# Patient Record
Sex: MALE | Race: WHITE | NOT HISPANIC OR LATINO | Employment: OTHER | URBAN - NONMETROPOLITAN AREA
[De-identification: names, ages, dates, MRNs, and addresses within clinical notes are randomized per-mention and may not be internally consistent; named-entity substitution may affect disease eponyms.]

---

## 2019-01-01 ENCOUNTER — HOSPITAL ENCOUNTER (INPATIENT)
Facility: HOSPITAL | Age: 54
LOS: 4 days | End: 2019-11-17
Attending: HOSPITALIST | Admitting: HOSPITALIST

## 2019-01-01 ENCOUNTER — APPOINTMENT (OUTPATIENT)
Dept: GENERAL RADIOLOGY | Facility: HOSPITAL | Age: 54
End: 2019-01-01

## 2019-01-01 ENCOUNTER — ANESTHESIA EVENT (OUTPATIENT)
Dept: ICU | Facility: HOSPITAL | Age: 54
End: 2019-01-01

## 2019-01-01 ENCOUNTER — APPOINTMENT (OUTPATIENT)
Dept: CARDIOLOGY | Facility: HOSPITAL | Age: 54
End: 2019-01-01

## 2019-01-01 ENCOUNTER — ANESTHESIA (OUTPATIENT)
Dept: CARDIOLOGY | Facility: HOSPITAL | Age: 54
End: 2019-01-01

## 2019-01-01 ENCOUNTER — ANESTHESIA (OUTPATIENT)
Dept: ICU | Facility: HOSPITAL | Age: 54
End: 2019-01-01

## 2019-01-01 ENCOUNTER — ANESTHESIA EVENT (OUTPATIENT)
Dept: CARDIOLOGY | Facility: HOSPITAL | Age: 54
End: 2019-01-01

## 2019-01-01 VITALS
RESPIRATION RATE: 16 BRPM | BODY MASS INDEX: 23.11 KG/M2 | WEIGHT: 147.27 LBS | DIASTOLIC BLOOD PRESSURE: 59 MMHG | HEART RATE: 88 BPM | HEIGHT: 67 IN | SYSTOLIC BLOOD PRESSURE: 85 MMHG | TEMPERATURE: 98.1 F | OXYGEN SATURATION: 89 %

## 2019-01-01 DIAGNOSIS — R07.9 CHEST PAIN, UNSPECIFIED TYPE: ICD-10-CM

## 2019-01-01 DIAGNOSIS — R06.00 DYSPNEA, UNSPECIFIED TYPE: ICD-10-CM

## 2019-01-01 DIAGNOSIS — R77.8 ELEVATED TROPONIN: Primary | ICD-10-CM

## 2019-01-01 LAB
A-A DO2: 109.2 MMHG
A-A DO2: 124.7 MMHG
A-A DO2: 327.6 MMHG
A-A DO2: 65.3 MMHG
A-A DO2: 97.1 MMHG
ALBUMIN SERPL-MCNC: 3.3 G/DL (ref 3.5–5.2)
ALBUMIN SERPL-MCNC: 3.4 G/DL (ref 3.5–5.2)
ALBUMIN/GLOB SERPL: 1.7 G/DL
ALP SERPL-CCNC: 102 U/L (ref 39–117)
ALT SERPL W P-5'-P-CCNC: 35 U/L (ref 1–41)
ANION GAP SERPL CALCULATED.3IONS-SCNC: 15 MMOL/L (ref 5–15)
ANION GAP SERPL CALCULATED.3IONS-SCNC: 15 MMOL/L (ref 5–15)
ANION GAP SERPL CALCULATED.3IONS-SCNC: 16 MMOL/L (ref 5–15)
ANION GAP SERPL CALCULATED.3IONS-SCNC: 17 MMOL/L (ref 5–15)
ANION GAP SERPL CALCULATED.3IONS-SCNC: 18 MMOL/L (ref 5–15)
ANION GAP SERPL CALCULATED.3IONS-SCNC: 8 MMOL/L (ref 5–15)
ANION GAP SERPL CALCULATED.3IONS-SCNC: 9 MMOL/L (ref 5–15)
APTT PPP: 105.7 SECONDS (ref 20–40.3)
APTT PPP: 34.6 SECONDS (ref 20–40.3)
APTT PPP: 46.2 SECONDS (ref 20–40.3)
APTT PPP: 53.2 SECONDS (ref 20–40.3)
APTT PPP: 58.1 SECONDS (ref 20–40.3)
APTT PPP: 59.7 SECONDS (ref 20–40.3)
APTT PPP: 63.2 SECONDS (ref 20–40.3)
APTT PPP: 66.9 SECONDS (ref 20–40.3)
APTT PPP: 74 SECONDS (ref 20–40.3)
ARTERIAL PATENCY WRIST A: ABNORMAL
AST SERPL-CCNC: 41 U/L (ref 1–40)
ATMOSPHERIC PRESS: 752 MMHG
ATMOSPHERIC PRESS: 753 MMHG
ATMOSPHERIC PRESS: 756 MMHG
ATMOSPHERIC PRESS: 757 MMHG
BACTERIA SPEC RESP CULT: NORMAL
BASE EXCESS BLDA CALC-SCNC: -1 MMOL/L (ref 0–2)
BASE EXCESS BLDA CALC-SCNC: -13.5 MMOL/L (ref 0–2)
BASE EXCESS BLDA CALC-SCNC: -13.7 MMOL/L (ref 0–2)
BASE EXCESS BLDA CALC-SCNC: -3.9 MMOL/L (ref 0–2)
BASE EXCESS BLDA CALC-SCNC: -4.3 MMOL/L (ref 0–2)
BASE EXCESS BLDA CALC-SCNC: -4.5 MMOL/L (ref 0–2)
BASE EXCESS BLDA CALC-SCNC: -9.8 MMOL/L (ref 0–2)
BASE EXCESS BLDA CALC-SCNC: 10.6 MMOL/L (ref 0–2)
BASE EXCESS BLDA CALC-SCNC: 5.2 MMOL/L (ref 0–2)
BASE EXCESS BLDA CALC-SCNC: 5.5 MMOL/L (ref 0–2)
BASE EXCESS BLDA CALC-SCNC: 8.1 MMOL/L (ref 0–2)
BASOPHILS # BLD AUTO: 0.02 10*3/MM3 (ref 0–0.2)
BASOPHILS # BLD AUTO: 0.03 10*3/MM3 (ref 0–0.2)
BASOPHILS # BLD AUTO: 0.04 10*3/MM3 (ref 0–0.2)
BASOPHILS # BLD AUTO: 0.1 10*3/MM3 (ref 0–0.2)
BASOPHILS # BLD AUTO: 0.1 10*3/MM3 (ref 0–0.2)
BASOPHILS NFR BLD AUTO: 0.2 % (ref 0–1.5)
BASOPHILS NFR BLD AUTO: 0.3 % (ref 0–1.5)
BASOPHILS NFR BLD AUTO: 0.3 % (ref 0–1.5)
BASOPHILS NFR BLD AUTO: 0.9 % (ref 0–1.5)
BASOPHILS NFR BLD AUTO: 0.9 % (ref 0–1.5)
BDY SITE: ABNORMAL
BH CV ECHO MEAS - ACS: 1.7 CM
BH CV ECHO MEAS - AI DEC SLOPE: 323 CM/SEC^2
BH CV ECHO MEAS - AI MAX PG: 53 MMHG
BH CV ECHO MEAS - AI MAX VEL: 364 CM/SEC
BH CV ECHO MEAS - AI P1/2T: 330.1 MSEC
BH CV ECHO MEAS - AO ISTHMUS: 2.6 CM
BH CV ECHO MEAS - AO MAX PG (FULL): 3.6 MMHG
BH CV ECHO MEAS - AO MAX PG: 5.3 MMHG
BH CV ECHO MEAS - AO MEAN PG (FULL): 2 MMHG
BH CV ECHO MEAS - AO MEAN PG: 3 MMHG
BH CV ECHO MEAS - AO ROOT AREA (BSA CORRECTED): 1.7
BH CV ECHO MEAS - AO ROOT AREA: 8 CM^2
BH CV ECHO MEAS - AO ROOT DIAM: 3.2 CM
BH CV ECHO MEAS - AO V2 MAX: 115 CM/SEC
BH CV ECHO MEAS - AO V2 MEAN: 85.2 CM/SEC
BH CV ECHO MEAS - AO V2 VTI: 11.3 CM
BH CV ECHO MEAS - ASC AORTA: 3.1 CM
BH CV ECHO MEAS - AVA(I,A): 2.4 CM^2
BH CV ECHO MEAS - AVA(I,D): 2.4 CM^2
BH CV ECHO MEAS - AVA(V,A): 1.8 CM^2
BH CV ECHO MEAS - AVA(V,D): 1.8 CM^2
BH CV ECHO MEAS - BSA(HAYCOCK): 1.9 M^2
BH CV ECHO MEAS - BSA(HAYCOCK): 1.9 M^2
BH CV ECHO MEAS - BSA: 1.8 M^2
BH CV ECHO MEAS - BSA: 1.9 M^2
BH CV ECHO MEAS - BZI_BMI: 24.9 KILOGRAMS/M^2
BH CV ECHO MEAS - BZI_BMI: 25.7 KILOGRAMS/M^2
BH CV ECHO MEAS - BZI_METRIC_HEIGHT: 170.2 CM
BH CV ECHO MEAS - BZI_METRIC_HEIGHT: 170.2 CM
BH CV ECHO MEAS - BZI_METRIC_WEIGHT: 72.1 KG
BH CV ECHO MEAS - BZI_METRIC_WEIGHT: 74.4 KG
BH CV ECHO MEAS - EDV(CUBED): 481.9 ML
BH CV ECHO MEAS - EDV(CUBED): 607.6 ML
BH CV ECHO MEAS - EDV(TEICH): 329.4 ML
BH CV ECHO MEAS - EDV(TEICH): 391.3 ML
BH CV ECHO MEAS - EF(CUBED): 28.8 %
BH CV ECHO MEAS - EF(CUBED): 39.6 %
BH CV ECHO MEAS - EF(MOD-BP): 22 %
BH CV ECHO MEAS - EF(TEICH): 22.5 %
BH CV ECHO MEAS - EF(TEICH): 31.3 %
BH CV ECHO MEAS - ESV(CUBED): 343 ML
BH CV ECHO MEAS - ESV(CUBED): 367.1 ML
BH CV ECHO MEAS - ESV(TEICH): 255.4 ML
BH CV ECHO MEAS - ESV(TEICH): 268.8 ML
BH CV ECHO MEAS - FS: 10.7 %
BH CV ECHO MEAS - FS: 15.5 %
BH CV ECHO MEAS - IVS/LVPW: 0.94
BH CV ECHO MEAS - IVS/LVPW: 1.4
BH CV ECHO MEAS - IVSD: 0.91 CM
BH CV ECHO MEAS - IVSD: 1.5 CM
BH CV ECHO MEAS - LA DIMENSION: 4.2 CM
BH CV ECHO MEAS - LA/AO: 1.3
BH CV ECHO MEAS - LV MASS(C)D: 364.4 GRAMS
BH CV ECHO MEAS - LV MASS(C)D: 605.5 GRAMS
BH CV ECHO MEAS - LV MASS(C)DI: 198.7 GRAMS/M^2
BH CV ECHO MEAS - LV MASS(C)DI: 325.8 GRAMS/M^2
BH CV ECHO MEAS - LV MAX PG: 1.7 MMHG
BH CV ECHO MEAS - LV MEAN PG: 1 MMHG
BH CV ECHO MEAS - LV V1 MAX: 64.6 CM/SEC
BH CV ECHO MEAS - LV V1 MEAN: 48.4 CM/SEC
BH CV ECHO MEAS - LV V1 VTI: 8.7 CM
BH CV ECHO MEAS - LVIDD: 7.8 CM
BH CV ECHO MEAS - LVIDD: 8.5 CM
BH CV ECHO MEAS - LVIDS: 7 CM
BH CV ECHO MEAS - LVIDS: 7.2 CM
BH CV ECHO MEAS - LVOT AREA (M): 3.1 CM^2
BH CV ECHO MEAS - LVOT AREA: 3.1 CM^2
BH CV ECHO MEAS - LVOT DIAM: 2 CM
BH CV ECHO MEAS - LVPWD: 0.97 CM
BH CV ECHO MEAS - LVPWD: 1.1 CM
BH CV ECHO MEAS - MR MAX PG: 65.9 MMHG
BH CV ECHO MEAS - MR MAX VEL: 406 CM/SEC
BH CV ECHO MEAS - MV A MAX VEL: 73.7 CM/SEC
BH CV ECHO MEAS - MV AREA (1 DIAM): 9.6 CM^2
BH CV ECHO MEAS - MV DEC SLOPE: 1198 CM/SEC^2
BH CV ECHO MEAS - MV DIAM: 3.5 CM
BH CV ECHO MEAS - MV E MAX VEL: 128 CM/SEC
BH CV ECHO MEAS - MV E/A: 1.7
BH CV ECHO MEAS - MV FLOW AREA(1DIAM): 9.6 CM^2
BH CV ECHO MEAS - MV MAX PG: 7.4 MMHG
BH CV ECHO MEAS - MV MEAN PG: 3 MMHG
BH CV ECHO MEAS - MV P1/2T MAX VEL: 137 CM/SEC
BH CV ECHO MEAS - MV P1/2T: 33.5 MSEC
BH CV ECHO MEAS - MV V2 MAX: 136 CM/SEC
BH CV ECHO MEAS - MV V2 MEAN: 83.7 CM/SEC
BH CV ECHO MEAS - MV V2 VTI: 25.6 CM
BH CV ECHO MEAS - MVA P1/2T LCG: 1.6 CM^2
BH CV ECHO MEAS - MVA(P1/2T): 6.6 CM^2
BH CV ECHO MEAS - MVA(VTI): 1.1 CM^2
BH CV ECHO MEAS - PA MAX PG: 3.1 MMHG
BH CV ECHO MEAS - PA V2 MAX: 87.7 CM/SEC
BH CV ECHO MEAS - PI END-D VEL: 108 CM/SEC
BH CV ECHO MEAS - RAP SYSTOLE: 12 MMHG
BH CV ECHO MEAS - RF(MV,AO)(1 DIAM): 0.63
BH CV ECHO MEAS - RF(MV,LVOT)(1DIAM): 0.89
BH CV ECHO MEAS - RVDD: 3.4 CM
BH CV ECHO MEAS - RVDD: 4.3 CM
BH CV ECHO MEAS - RVSP: 54 MMHG
BH CV ECHO MEAS - SI(AO): 48.9 ML/M^2
BH CV ECHO MEAS - SI(CUBED): 129.4 ML/M^2
BH CV ECHO MEAS - SI(CUBED): 75.7 ML/M^2
BH CV ECHO MEAS - SI(LVOT): 14.6 ML/M^2
BH CV ECHO MEAS - SI(MV 1 DIAM): 132.5 ML/M^2
BH CV ECHO MEAS - SI(TEICH): 40.3 ML/M^2
BH CV ECHO MEAS - SI(TEICH): 65.9 ML/M^2
BH CV ECHO MEAS - SV(AO): 90.9 ML
BH CV ECHO MEAS - SV(CUBED): 138.9 ML
BH CV ECHO MEAS - SV(CUBED): 240.6 ML
BH CV ECHO MEAS - SV(LVOT): 27.2 ML
BH CV ECHO MEAS - SV(MV 1 DIAM): 246.3 ML
BH CV ECHO MEAS - SV(TEICH): 122.5 ML
BH CV ECHO MEAS - SV(TEICH): 74 ML
BH CV ECHO MEAS - TR MAX VEL: 341 CM/SEC
BILIRUB SERPL-MCNC: 1.3 MG/DL (ref 0.2–1.2)
BUN BLD-MCNC: 22 MG/DL (ref 6–20)
BUN BLD-MCNC: 23 MG/DL (ref 6–20)
BUN BLD-MCNC: 27 MG/DL (ref 6–20)
BUN BLD-MCNC: 33 MG/DL (ref 6–20)
BUN BLD-MCNC: 34 MG/DL (ref 6–20)
BUN BLD-MCNC: 35 MG/DL (ref 6–20)
BUN BLD-MCNC: 35 MG/DL (ref 6–20)
BUN BLD-MCNC: 37 MG/DL (ref 6–20)
BUN BLD-MCNC: 40 MG/DL (ref 6–20)
BUN/CREAT SERPL: 19.6 (ref 7–25)
BUN/CREAT SERPL: 20 (ref 7–25)
BUN/CREAT SERPL: 20.8 (ref 7–25)
BUN/CREAT SERPL: 21.7 (ref 7–25)
BUN/CREAT SERPL: 23.5 (ref 7–25)
BUN/CREAT SERPL: 24.2 (ref 7–25)
BUN/CREAT SERPL: 24.3 (ref 7–25)
BUN/CREAT SERPL: 24.8 (ref 7–25)
BUN/CREAT SERPL: 27.2 (ref 7–25)
CA-I BLD-MCNC: 4.06 MG/DL (ref 4.6–5.6)
CA-I BLD-MCNC: 4.15 MG/DL (ref 4.6–5.6)
CA-I BLD-MCNC: 4.25 MG/DL (ref 4.6–5.6)
CA-I BLD-MCNC: 4.3 MG/DL (ref 4.6–5.6)
CA-I BLD-MCNC: 4.32 MG/DL (ref 4.6–5.6)
CALCIUM SPEC-SCNC: 7.5 MG/DL (ref 8.6–10.5)
CALCIUM SPEC-SCNC: 8.3 MG/DL (ref 8.6–10.5)
CALCIUM SPEC-SCNC: 8.3 MG/DL (ref 8.6–10.5)
CALCIUM SPEC-SCNC: 8.4 MG/DL (ref 8.6–10.5)
CALCIUM SPEC-SCNC: 8.4 MG/DL (ref 8.6–10.5)
CALCIUM SPEC-SCNC: 8.9 MG/DL (ref 8.6–10.5)
CALCIUM SPEC-SCNC: 9.1 MG/DL (ref 8.6–10.5)
CALCIUM SPEC-SCNC: 9.1 MG/DL (ref 8.6–10.5)
CALCIUM SPEC-SCNC: 9.3 MG/DL (ref 8.6–10.5)
CHLORIDE SERPL-SCNC: 100 MMOL/L (ref 98–107)
CHLORIDE SERPL-SCNC: 101 MMOL/L (ref 98–107)
CHLORIDE SERPL-SCNC: 102 MMOL/L (ref 98–107)
CHLORIDE SERPL-SCNC: 95 MMOL/L (ref 98–107)
CHLORIDE SERPL-SCNC: 97 MMOL/L (ref 98–107)
CHLORIDE SERPL-SCNC: 97 MMOL/L (ref 98–107)
CHLORIDE SERPL-SCNC: 98 MMOL/L (ref 98–107)
CHLORIDE SERPL-SCNC: 99 MMOL/L (ref 98–107)
CHLORIDE SERPL-SCNC: 99 MMOL/L (ref 98–107)
CO2 SERPL-SCNC: 21 MMOL/L (ref 22–29)
CO2 SERPL-SCNC: 24 MMOL/L (ref 22–29)
CO2 SERPL-SCNC: 24 MMOL/L (ref 22–29)
CO2 SERPL-SCNC: 25 MMOL/L (ref 22–29)
CO2 SERPL-SCNC: 26 MMOL/L (ref 22–29)
CO2 SERPL-SCNC: 32 MMOL/L (ref 22–29)
CO2 SERPL-SCNC: 33 MMOL/L (ref 22–29)
CO2 SERPL-SCNC: 33 MMOL/L (ref 22–29)
CO2 SERPL-SCNC: 35 MMOL/L (ref 22–29)
COHGB MFR BLD: 0.7 % (ref 0–5)
COHGB MFR BLD: 0.7 % (ref 0–5)
COHGB MFR BLD: 0.9 % (ref 0–5)
CREAT BLD-MCNC: 1.06 MG/DL (ref 0.76–1.27)
CREAT BLD-MCNC: 1.1 MG/DL (ref 0.76–1.27)
CREAT BLD-MCNC: 1.15 MG/DL (ref 0.76–1.27)
CREAT BLD-MCNC: 1.25 MG/DL (ref 0.76–1.27)
CREAT BLD-MCNC: 1.36 MG/DL (ref 0.76–1.27)
CREAT BLD-MCNC: 1.53 MG/DL (ref 0.76–1.27)
CREAT BLD-MCNC: 1.61 MG/DL (ref 0.76–1.27)
CREAT BLD-MCNC: 1.68 MG/DL (ref 0.76–1.27)
CREAT BLD-MCNC: 1.79 MG/DL (ref 0.76–1.27)
DEPRECATED RDW RBC AUTO: 46.2 FL (ref 37–54)
DEPRECATED RDW RBC AUTO: 49.5 FL (ref 37–54)
DEPRECATED RDW RBC AUTO: 51.3 FL (ref 37–54)
DEPRECATED RDW RBC AUTO: 51.9 FL (ref 37–54)
DEPRECATED RDW RBC AUTO: 52.4 FL (ref 37–54)
DEPRECATED RDW RBC AUTO: 53.6 FL (ref 37–54)
DEPRECATED RDW RBC AUTO: 57 FL (ref 37–54)
EOSINOPHIL # BLD AUTO: 0 10*3/MM3 (ref 0–0.4)
EOSINOPHIL # BLD AUTO: 0.1 10*3/MM3 (ref 0–0.4)
EOSINOPHIL # BLD AUTO: 0.12 10*3/MM3 (ref 0–0.4)
EOSINOPHIL NFR BLD AUTO: 0 % (ref 0.3–6.2)
EOSINOPHIL NFR BLD AUTO: 0.9 % (ref 0.3–6.2)
EOSINOPHIL NFR BLD AUTO: 1 % (ref 0.3–6.2)
ERYTHROCYTE [DISTWIDTH] IN BLOOD BY AUTOMATED COUNT: 14.4 % (ref 12.3–15.4)
ERYTHROCYTE [DISTWIDTH] IN BLOOD BY AUTOMATED COUNT: 15.1 % (ref 12.3–15.4)
ERYTHROCYTE [DISTWIDTH] IN BLOOD BY AUTOMATED COUNT: 15.2 % (ref 12.3–15.4)
ERYTHROCYTE [DISTWIDTH] IN BLOOD BY AUTOMATED COUNT: 15.5 % (ref 12.3–15.4)
ERYTHROCYTE [DISTWIDTH] IN BLOOD BY AUTOMATED COUNT: 15.6 % (ref 12.3–15.4)
ERYTHROCYTE [DISTWIDTH] IN BLOOD BY AUTOMATED COUNT: 15.6 % (ref 12.3–15.4)
ERYTHROCYTE [DISTWIDTH] IN BLOOD BY AUTOMATED COUNT: 16.4 % (ref 12.3–15.4)
GFR SERPL CREATININE-BSD FRML MDRD: 40 ML/MIN/1.73
GFR SERPL CREATININE-BSD FRML MDRD: 43 ML/MIN/1.73
GFR SERPL CREATININE-BSD FRML MDRD: 45 ML/MIN/1.73
GFR SERPL CREATININE-BSD FRML MDRD: 48 ML/MIN/1.73
GFR SERPL CREATININE-BSD FRML MDRD: 55 ML/MIN/1.73
GFR SERPL CREATININE-BSD FRML MDRD: 60 ML/MIN/1.73
GFR SERPL CREATININE-BSD FRML MDRD: 66 ML/MIN/1.73
GFR SERPL CREATININE-BSD FRML MDRD: 70 ML/MIN/1.73
GFR SERPL CREATININE-BSD FRML MDRD: 73 ML/MIN/1.73
GLOBULIN UR ELPH-MCNC: 2 GM/DL
GLUCOSE BLD-MCNC: 102 MG/DL (ref 65–99)
GLUCOSE BLD-MCNC: 109 MG/DL (ref 65–99)
GLUCOSE BLD-MCNC: 112 MG/DL (ref 65–99)
GLUCOSE BLD-MCNC: 118 MG/DL (ref 65–99)
GLUCOSE BLD-MCNC: 118 MG/DL (ref 65–99)
GLUCOSE BLD-MCNC: 122 MG/DL (ref 65–99)
GLUCOSE BLD-MCNC: 122 MG/DL (ref 65–99)
GLUCOSE BLD-MCNC: 137 MG/DL (ref 65–99)
GLUCOSE BLD-MCNC: 152 MG/DL (ref 65–99)
GLUCOSE BLDA-MCNC: 117 MMOL/L (ref 65–95)
GLUCOSE BLDA-MCNC: 131 MMOL/L (ref 65–95)
GLUCOSE BLDA-MCNC: 144 MMOL/L (ref 65–95)
GLUCOSE BLDA-MCNC: 173 MMOL/L (ref 65–95)
GLUCOSE BLDA-MCNC: 186 MMOL/L (ref 65–95)
GRAM STN SPEC: NORMAL
HCO3 BLDA-SCNC: 16.4 MMOL/L (ref 20–26)
HCO3 BLDA-SCNC: 16.6 MMOL/L (ref 20–26)
HCO3 BLDA-SCNC: 19.3 MMOL/L (ref 20–26)
HCO3 BLDA-SCNC: 21.2 MMOL/L (ref 20–26)
HCO3 BLDA-SCNC: 22.5 MMOL/L (ref 20–26)
HCO3 BLDA-SCNC: 23.7 MMOL/L (ref 20–26)
HCO3 BLDA-SCNC: 27.6 MMOL/L (ref 20–26)
HCO3 BLDA-SCNC: 32.7 MMOL/L (ref 20–26)
HCO3 BLDA-SCNC: 33.5 MMOL/L (ref 20–26)
HCO3 BLDA-SCNC: 33.9 MMOL/L (ref 20–26)
HCO3 BLDA-SCNC: 35.7 MMOL/L (ref 20–26)
HCT VFR BLD AUTO: 36.9 % (ref 37.5–51)
HCT VFR BLD AUTO: 38.1 % (ref 37.5–51)
HCT VFR BLD AUTO: 38.7 % (ref 37.5–51)
HCT VFR BLD AUTO: 39.9 % (ref 37.5–51)
HCT VFR BLD AUTO: 44.4 % (ref 37.5–51)
HCT VFR BLD AUTO: 44.7 % (ref 37.5–51)
HCT VFR BLD AUTO: 45.7 % (ref 37.5–51)
HCT VFR BLD CALC: 40.4 % (ref 38–51)
HCT VFR BLD CALC: 40.8 % (ref 38–51)
HCT VFR BLD CALC: 40.8 % (ref 38–51)
HCT VFR BLD CALC: 42.5 % (ref 38–51)
HCT VFR BLD CALC: 44.6 % (ref 38–51)
HGB BLD-MCNC: 12.1 G/DL (ref 13–17.7)
HGB BLD-MCNC: 12.9 G/DL (ref 13–17.7)
HGB BLD-MCNC: 12.9 G/DL (ref 13–17.7)
HGB BLD-MCNC: 13.2 G/DL (ref 13–17.7)
HGB BLD-MCNC: 15.4 G/DL (ref 13–17.7)
HGB BLD-MCNC: 15.7 G/DL (ref 13–17.7)
HGB BLD-MCNC: 15.8 G/DL (ref 13–17.7)
HGB BLDA-MCNC: 13.2 G/DL (ref 14–18)
HGB BLDA-MCNC: 13.3 G/DL (ref 14–18)
HGB BLDA-MCNC: 13.3 G/DL (ref 14–18)
HGB BLDA-MCNC: 13.9 G/DL (ref 14–18)
HGB BLDA-MCNC: 14.5 G/DL (ref 14–18)
HOROWITZ INDEX BLD+IHG-RTO: 100 %
HOROWITZ INDEX BLD+IHG-RTO: 30 %
HOROWITZ INDEX BLD+IHG-RTO: 35 %
HOROWITZ INDEX BLD+IHG-RTO: 40 %
HOROWITZ INDEX BLD+IHG-RTO: 40 %
HOROWITZ INDEX BLD+IHG-RTO: 50 %
IMM GRANULOCYTES # BLD AUTO: 0.05 10*3/MM3 (ref 0–0.05)
IMM GRANULOCYTES # BLD AUTO: 0.06 10*3/MM3 (ref 0–0.05)
IMM GRANULOCYTES # BLD AUTO: 0.06 10*3/MM3 (ref 0–0.05)
IMM GRANULOCYTES # BLD AUTO: 0.07 10*3/MM3 (ref 0–0.05)
IMM GRANULOCYTES # BLD AUTO: 0.09 10*3/MM3 (ref 0–0.05)
IMM GRANULOCYTES NFR BLD AUTO: 0.4 % (ref 0–0.5)
IMM GRANULOCYTES NFR BLD AUTO: 0.4 % (ref 0–0.5)
IMM GRANULOCYTES NFR BLD AUTO: 0.5 % (ref 0–0.5)
IMM GRANULOCYTES NFR BLD AUTO: 0.6 % (ref 0–0.5)
IMM GRANULOCYTES NFR BLD AUTO: 0.7 % (ref 0–0.5)
INR PPP: 1.28 (ref 0.8–1.2)
INR PPP: 1.98 (ref 0.8–1.2)
LYMPHOCYTES # BLD AUTO: 0.8 10*3/MM3 (ref 0.7–3.1)
LYMPHOCYTES # BLD AUTO: 0.96 10*3/MM3 (ref 0.7–3.1)
LYMPHOCYTES # BLD AUTO: 1.01 10*3/MM3 (ref 0.7–3.1)
LYMPHOCYTES # BLD AUTO: 1.07 10*3/MM3 (ref 0.7–3.1)
LYMPHOCYTES # BLD AUTO: 1.24 10*3/MM3 (ref 0.7–3.1)
LYMPHOCYTES # BLD AUTO: 2.11 10*3/MM3 (ref 0.7–3.1)
LYMPHOCYTES # BLD AUTO: 2.15 10*3/MM3 (ref 0.7–3.1)
LYMPHOCYTES NFR BLD AUTO: 10.4 % (ref 19.6–45.3)
LYMPHOCYTES NFR BLD AUTO: 18.2 % (ref 19.6–45.3)
LYMPHOCYTES NFR BLD AUTO: 18.6 % (ref 19.6–45.3)
LYMPHOCYTES NFR BLD AUTO: 7.1 % (ref 19.6–45.3)
LYMPHOCYTES NFR BLD AUTO: 8.2 % (ref 19.6–45.3)
LYMPHOCYTES NFR BLD AUTO: 8.9 % (ref 19.6–45.3)
LYMPHOCYTES NFR BLD AUTO: 9.4 % (ref 19.6–45.3)
Lab: ABNORMAL
MAGNESIUM SERPL-MCNC: 1.6 MG/DL (ref 1.6–2.6)
MAGNESIUM SERPL-MCNC: 2 MG/DL (ref 1.6–2.6)
MAGNESIUM SERPL-MCNC: 2.1 MG/DL (ref 1.6–2.6)
MAXIMAL PREDICTED HEART RATE: 166 BPM
MAXIMAL PREDICTED HEART RATE: 166 BPM
MCH RBC QN AUTO: 30.7 PG (ref 26.6–33)
MCH RBC QN AUTO: 30.8 PG (ref 26.6–33)
MCH RBC QN AUTO: 31.2 PG (ref 26.6–33)
MCH RBC QN AUTO: 31.5 PG (ref 26.6–33)
MCH RBC QN AUTO: 31.5 PG (ref 26.6–33)
MCH RBC QN AUTO: 31.6 PG (ref 26.6–33)
MCH RBC QN AUTO: 32 PG (ref 26.6–33)
MCHC RBC AUTO-ENTMCNC: 32.8 G/DL (ref 31.5–35.7)
MCHC RBC AUTO-ENTMCNC: 33.1 G/DL (ref 31.5–35.7)
MCHC RBC AUTO-ENTMCNC: 33.3 G/DL (ref 31.5–35.7)
MCHC RBC AUTO-ENTMCNC: 33.9 G/DL (ref 31.5–35.7)
MCHC RBC AUTO-ENTMCNC: 34.6 G/DL (ref 31.5–35.7)
MCHC RBC AUTO-ENTMCNC: 34.7 G/DL (ref 31.5–35.7)
MCHC RBC AUTO-ENTMCNC: 35.1 G/DL (ref 31.5–35.7)
MCV RBC AUTO: 88.9 FL (ref 79–97)
MCV RBC AUTO: 91 FL (ref 79–97)
MCV RBC AUTO: 91.2 FL (ref 79–97)
MCV RBC AUTO: 92.9 FL (ref 79–97)
MCV RBC AUTO: 93.2 FL (ref 79–97)
MCV RBC AUTO: 94.4 FL (ref 79–97)
MCV RBC AUTO: 95.1 FL (ref 79–97)
METHGB BLD QL: 0.3 % (ref 0–3)
METHGB BLD QL: 0.4 % (ref 0–3)
METHGB BLD QL: 1.2 % (ref 0–3)
METHGB BLD QL: 1.2 % (ref 0–3)
METHGB BLD QL: 1.4 % (ref 0–3)
MODALITY: ABNORMAL
MONOCYTES # BLD AUTO: 0.56 10*3/MM3 (ref 0.1–0.9)
MONOCYTES # BLD AUTO: 0.61 10*3/MM3 (ref 0.1–0.9)
MONOCYTES # BLD AUTO: 0.87 10*3/MM3 (ref 0.1–0.9)
MONOCYTES # BLD AUTO: 0.91 10*3/MM3 (ref 0.1–0.9)
MONOCYTES # BLD AUTO: 0.99 10*3/MM3 (ref 0.1–0.9)
MONOCYTES # BLD AUTO: 1.01 10*3/MM3 (ref 0.1–0.9)
MONOCYTES # BLD AUTO: 1.2 10*3/MM3 (ref 0.1–0.9)
MONOCYTES NFR BLD AUTO: 5.1 % (ref 5–12)
MONOCYTES NFR BLD AUTO: 6.1 % (ref 5–12)
MONOCYTES NFR BLD AUTO: 7.5 % (ref 5–12)
MONOCYTES NFR BLD AUTO: 7.9 % (ref 5–12)
MONOCYTES NFR BLD AUTO: 8.2 % (ref 5–12)
MONOCYTES NFR BLD AUTO: 8.8 % (ref 5–12)
MONOCYTES NFR BLD AUTO: 9.1 % (ref 5–12)
NEUTROPHILS # BLD AUTO: 10.21 10*3/MM3 (ref 1.7–7)
NEUTROPHILS # BLD AUTO: 10.28 10*3/MM3 (ref 1.7–7)
NEUTROPHILS # BLD AUTO: 10.7 10*3/MM3 (ref 1.7–7)
NEUTROPHILS # BLD AUTO: 7.66 10*3/MM3 (ref 1.7–7)
NEUTROPHILS # BLD AUTO: 8.26 10*3/MM3 (ref 1.7–7)
NEUTROPHILS # BLD AUTO: 8.38 10*3/MM3 (ref 1.7–7)
NEUTROPHILS # BLD AUTO: 9.34 10*3/MM3 (ref 1.7–7)
NEUTROPHILS NFR BLD AUTO: 71.2 % (ref 42.7–76)
NEUTROPHILS NFR BLD AUTO: 72.1 % (ref 42.7–76)
NEUTROPHILS NFR BLD AUTO: 80.6 % (ref 42.7–76)
NEUTROPHILS NFR BLD AUTO: 82.8 % (ref 42.7–76)
NEUTROPHILS NFR BLD AUTO: 82.8 % (ref 42.7–76)
NEUTROPHILS NFR BLD AUTO: 83.3 % (ref 42.7–76)
NEUTROPHILS NFR BLD AUTO: 85.2 % (ref 42.7–76)
NOTE: ABNORMAL
NRBC BLD AUTO-RTO: 0 /100 WBC (ref 0–0.2)
NRBC BLD AUTO-RTO: 0.2 /100 WBC (ref 0–0.2)
OXYHGB MFR BLDV: 92 % (ref 94–99)
OXYHGB MFR BLDV: 93.9 % (ref 94–99)
OXYHGB MFR BLDV: 95.5 % (ref 94–99)
OXYHGB MFR BLDV: 96.3 % (ref 94–99)
OXYHGB MFR BLDV: >98.5 % (ref 94–99)
PAW @ PEAK INSP FLOW SETTING VENT: 14 CMH2O
PAW @ PEAK INSP FLOW SETTING VENT: 15 CMH2O
PCO2 BLDA: 38.3 MM HG (ref 35–45)
PCO2 BLDA: 47.4 MM HG (ref 35–45)
PCO2 BLDA: 47.8 MM HG (ref 35–45)
PCO2 BLDA: 48.1 MM HG (ref 35–45)
PCO2 BLDA: 54 MM HG (ref 35–45)
PCO2 BLDA: 54.1 MM HG (ref 35–45)
PCO2 BLDA: 54.5 MM HG (ref 35–45)
PCO2 BLDA: 55.4 MM HG (ref 35–45)
PCO2 BLDA: 58.5 MM HG (ref 35–45)
PCO2 BLDA: 62.9 MM HG (ref 35–45)
PCO2 BLDA: 68.8 MM HG (ref 35–45)
PCO2 TEMP ADJ BLD: ABNORMAL MM[HG]
PEEP RESPIRATORY: 0 CM[H2O]
PEEP RESPIRATORY: 5 CM[H2O]
PEEP RESPIRATORY: 5 CM[H2O]
PH BLDA: 7.09 PH UNITS (ref 7.35–7.45)
PH BLDA: 7.09 PH UNITS (ref 7.35–7.45)
PH BLDA: 7.16 PH UNITS (ref 7.35–7.45)
PH BLDA: 7.24 PH UNITS (ref 7.35–7.45)
PH BLDA: 7.25 PH UNITS (ref 7.35–7.45)
PH BLDA: 7.28 PH UNITS (ref 7.35–7.45)
PH BLDA: 7.3 PH UNITS (ref 7.35–7.45)
PH BLDA: 7.35 PH UNITS (ref 7.35–7.45)
PH BLDA: 7.36 PH UNITS (ref 7.35–7.45)
PH BLDA: 7.45 PH UNITS (ref 7.35–7.45)
PH BLDA: 7.48 PH UNITS (ref 7.35–7.45)
PH, TEMP CORRECTED: ABNORMAL
PHOSPHATE SERPL-MCNC: 4.8 MG/DL (ref 2.5–4.5)
PLATELET # BLD AUTO: 119 10*3/MM3 (ref 140–450)
PLATELET # BLD AUTO: 132 10*3/MM3 (ref 140–450)
PLATELET # BLD AUTO: 143 10*3/MM3 (ref 140–450)
PLATELET # BLD AUTO: 184 10*3/MM3 (ref 140–450)
PLATELET # BLD AUTO: 187 10*3/MM3 (ref 140–450)
PLATELET # BLD AUTO: 188 10*3/MM3 (ref 140–450)
PLATELET # BLD AUTO: 94 10*3/MM3 (ref 140–450)
PMV BLD AUTO: 10.8 FL (ref 6–12)
PMV BLD AUTO: 10.8 FL (ref 6–12)
PMV BLD AUTO: 11 FL (ref 6–12)
PMV BLD AUTO: 11.2 FL (ref 6–12)
PMV BLD AUTO: 11.3 FL (ref 6–12)
PO2 BLDA: 116 MM HG (ref 83–108)
PO2 BLDA: 157 MM HG (ref 83–108)
PO2 BLDA: 169 MM HG (ref 83–108)
PO2 BLDA: 172 MM HG (ref 83–108)
PO2 BLDA: 193 MM HG (ref 83–108)
PO2 BLDA: 331 MM HG (ref 83–108)
PO2 BLDA: 69.9 MM HG (ref 83–108)
PO2 BLDA: 72.5 MM HG (ref 83–108)
PO2 BLDA: 84.6 MM HG (ref 83–108)
PO2 BLDA: 85.3 MM HG (ref 83–108)
PO2 BLDA: 97.4 MM HG (ref 83–108)
PO2 TEMP ADJ BLD: ABNORMAL MM[HG]
POTASSIUM BLD-SCNC: 3.4 MMOL/L (ref 3.5–5.2)
POTASSIUM BLD-SCNC: 3.7 MMOL/L (ref 3.5–5.2)
POTASSIUM BLD-SCNC: 3.8 MMOL/L (ref 3.5–5.2)
POTASSIUM BLD-SCNC: 3.9 MMOL/L (ref 3.5–5.2)
POTASSIUM BLD-SCNC: 4 MMOL/L (ref 3.5–5.2)
POTASSIUM BLD-SCNC: 4 MMOL/L (ref 3.5–5.2)
POTASSIUM BLD-SCNC: 4.1 MMOL/L (ref 3.5–5.2)
POTASSIUM BLDA-SCNC: 3.1 MMOL/L (ref 3.4–4.5)
POTASSIUM BLDA-SCNC: 3.4 MMOL/L (ref 3.4–4.5)
POTASSIUM BLDA-SCNC: 3.6 MMOL/L (ref 3.4–4.5)
POTASSIUM BLDA-SCNC: 3.6 MMOL/L (ref 3.4–4.5)
POTASSIUM BLDA-SCNC: 3.7 MMOL/L (ref 3.4–4.5)
PROT SERPL-MCNC: 5.3 G/DL (ref 6–8.5)
PROTHROMBIN TIME: 15.8 SECONDS (ref 11.1–15.3)
PROTHROMBIN TIME: 22.3 SECONDS (ref 11.1–15.3)
PSV: 8 CMH2O
RBC # BLD AUTO: 3.88 10*6/MM3 (ref 4.14–5.8)
RBC # BLD AUTO: 4.1 10*6/MM3 (ref 4.14–5.8)
RBC # BLD AUTO: 4.1 10*6/MM3 (ref 4.14–5.8)
RBC # BLD AUTO: 4.28 10*6/MM3 (ref 4.14–5.8)
RBC # BLD AUTO: 4.87 10*6/MM3 (ref 4.14–5.8)
RBC # BLD AUTO: 4.91 10*6/MM3 (ref 4.14–5.8)
RBC # BLD AUTO: 5.14 10*6/MM3 (ref 4.14–5.8)
SAO2 % BLDCOA: 89.5 % (ref 94–99)
SAO2 % BLDCOA: 94 % (ref 94–99)
SAO2 % BLDCOA: 94.9 % (ref 94–99)
SAO2 % BLDCOA: 96 % (ref 94–99)
SAO2 % BLDCOA: 96.7 % (ref 94–99)
SAO2 % BLDCOA: 98.2 % (ref 94–99)
SAO2 % BLDCOA: 98.3 % (ref 94–99)
SAO2 % BLDCOA: 98.4 % (ref 94–99)
SAO2 % BLDCOA: 98.9 % (ref 94–99)
SAO2 % BLDCOA: 99.5 % (ref 94–99)
SAO2 % BLDCOA: 99.9 % (ref 94–99)
SET MECH RESP RATE: 12
SET MECH RESP RATE: 122
SET MECH RESP RATE: 16
SMALL PLATELETS BLD QL SMEAR: NORMAL
SODIUM BLD-SCNC: 138 MMOL/L (ref 136–145)
SODIUM BLD-SCNC: 139 MMOL/L (ref 136–145)
SODIUM BLD-SCNC: 140 MMOL/L (ref 136–145)
SODIUM BLD-SCNC: 140 MMOL/L (ref 136–145)
SODIUM BLD-SCNC: 141 MMOL/L (ref 136–145)
SODIUM BLD-SCNC: 143 MMOL/L (ref 136–145)
SODIUM BLDA-SCNC: 136 MMOL/L (ref 136–146)
SODIUM BLDA-SCNC: 136 MMOL/L (ref 136–146)
SODIUM BLDA-SCNC: 138 MMOL/L (ref 136–146)
SODIUM BLDA-SCNC: 140 MMOL/L (ref 136–146)
SODIUM BLDA-SCNC: 143 MMOL/L (ref 136–146)
STRESS TARGET HR: 141 BPM
STRESS TARGET HR: 141 BPM
TARGETS BLD QL SMEAR: NORMAL
TROPONIN T SERPL-MCNC: 0.01 NG/ML (ref 0–0.03)
TROPONIN T SERPL-MCNC: 0.02 NG/ML (ref 0–0.03)
TROPONIN T SERPL-MCNC: 0.02 NG/ML (ref 0–0.03)
VENTILATOR MODE: ABNORMAL
VENTILATOR MODE: ABNORMAL
VENTILATOR MODE: AC
VT ON VENT VENT: 550 ML
WBC MORPH BLD: NORMAL
WBC NRBC COR # BLD: 11.22 10*3/MM3 (ref 3.4–10.8)
WBC NRBC COR # BLD: 11.59 10*3/MM3 (ref 3.4–10.8)
WBC NRBC COR # BLD: 11.61 10*3/MM3 (ref 3.4–10.8)
WBC NRBC COR # BLD: 11.99 10*3/MM3 (ref 3.4–10.8)
WBC NRBC COR # BLD: 12.39 10*3/MM3 (ref 3.4–10.8)
WBC NRBC COR # BLD: 13.25 10*3/MM3 (ref 3.4–10.8)
WBC NRBC COR # BLD: 9.25 10*3/MM3 (ref 3.4–10.8)

## 2019-01-01 PROCEDURE — 94660 CPAP INITIATION&MGMT: CPT

## 2019-01-01 PROCEDURE — 80048 BASIC METABOLIC PNL TOTAL CA: CPT | Performed by: HOSPITALIST

## 2019-01-01 PROCEDURE — 3E0G76Z INTRODUCTION OF NUTRITIONAL SUBSTANCE INTO UPPER GI, VIA NATURAL OR ARTIFICIAL OPENING: ICD-10-PCS | Performed by: FAMILY MEDICINE

## 2019-01-01 PROCEDURE — 25010000002 AMIODARONE IN DEXTROSE 5% 360-4.14 MG/200ML-% SOLUTION: Performed by: NURSE PRACTITIONER

## 2019-01-01 PROCEDURE — 82375 ASSAY CARBOXYHB QUANT: CPT

## 2019-01-01 PROCEDURE — 94799 UNLISTED PULMONARY SVC/PX: CPT

## 2019-01-01 PROCEDURE — P9041 ALBUMIN (HUMAN),5%, 50ML: HCPCS

## 2019-01-01 PROCEDURE — C1894 INTRO/SHEATH, NON-LASER: HCPCS | Performed by: INTERNAL MEDICINE

## 2019-01-01 PROCEDURE — C1898 LEAD, PMKR, OTHER THAN TRANS: HCPCS | Performed by: INTERNAL MEDICINE

## 2019-01-01 PROCEDURE — 25010000002 FENTANYL CITRATE (PF) 100 MCG/2ML SOLUTION: Performed by: NURSE PRACTITIONER

## 2019-01-01 PROCEDURE — 93005 ELECTROCARDIOGRAM TRACING: CPT | Performed by: INTERNAL MEDICINE

## 2019-01-01 PROCEDURE — 84484 ASSAY OF TROPONIN QUANT: CPT | Performed by: HOSPITALIST

## 2019-01-01 PROCEDURE — 99024 POSTOP FOLLOW-UP VISIT: CPT | Performed by: NURSE PRACTITIONER

## 2019-01-01 PROCEDURE — 33249 INSJ/RPLCMT DEFIB W/LEAD(S): CPT | Performed by: INTERNAL MEDICINE

## 2019-01-01 PROCEDURE — 74018 RADEX ABDOMEN 1 VIEW: CPT

## 2019-01-01 PROCEDURE — 85025 COMPLETE CBC W/AUTO DIFF WBC: CPT | Performed by: HOSPITALIST

## 2019-01-01 PROCEDURE — 99024 POSTOP FOLLOW-UP VISIT: CPT | Performed by: INTERNAL MEDICINE

## 2019-01-01 PROCEDURE — C1760 CLOSURE DEV, VASC: HCPCS | Performed by: INTERNAL MEDICINE

## 2019-01-01 PROCEDURE — 25010000002 DOBUTAMINE PER 250 MG: Performed by: INTERNAL MEDICINE

## 2019-01-01 PROCEDURE — 85730 THROMBOPLASTIN TIME PARTIAL: CPT | Performed by: INTERNAL MEDICINE

## 2019-01-01 PROCEDURE — 25010000002 LORAZEPAM PER 2 MG: Performed by: FAMILY MEDICINE

## 2019-01-01 PROCEDURE — 94760 N-INVAS EAR/PLS OXIMETRY 1: CPT

## 2019-01-01 PROCEDURE — 25010000002 FENTANYL CITRATE (PF) 100 MCG/2ML SOLUTION: Performed by: NURSE ANESTHETIST, CERTIFIED REGISTERED

## 2019-01-01 PROCEDURE — 0JH608Z INSERTION OF DEFIBRILLATOR GENERATOR INTO CHEST SUBCUTANEOUS TISSUE AND FASCIA, OPEN APPROACH: ICD-10-PCS | Performed by: INTERNAL MEDICINE

## 2019-01-01 PROCEDURE — 25010000002 HEPARIN (PORCINE) PER 1000 UNITS: Performed by: INTERNAL MEDICINE

## 2019-01-01 PROCEDURE — 25010000002 MIDAZOLAM PER 1 MG: Performed by: INTERNAL MEDICINE

## 2019-01-01 PROCEDURE — 02H63KZ INSERTION OF DEFIBRILLATOR LEAD INTO RIGHT ATRIUM, PERCUTANEOUS APPROACH: ICD-10-PCS | Performed by: INTERNAL MEDICINE

## 2019-01-01 PROCEDURE — 71045 X-RAY EXAM CHEST 1 VIEW: CPT

## 2019-01-01 PROCEDURE — 25010000002 ZIPRASIDONE MESYLATE PER 10 MG: Performed by: FAMILY MEDICINE

## 2019-01-01 PROCEDURE — G0378 HOSPITAL OBSERVATION PER HR: HCPCS

## 2019-01-01 PROCEDURE — 0 IOPAMIDOL PER 1 ML: Performed by: INTERNAL MEDICINE

## 2019-01-01 PROCEDURE — 83735 ASSAY OF MAGNESIUM: CPT | Performed by: INTERNAL MEDICINE

## 2019-01-01 PROCEDURE — 99232 SBSQ HOSP IP/OBS MODERATE 35: CPT | Performed by: INTERNAL MEDICINE

## 2019-01-01 PROCEDURE — 85025 COMPLETE CBC W/AUTO DIFF WBC: CPT | Performed by: INTERNAL MEDICINE

## 2019-01-01 PROCEDURE — 93325 DOPPLER ECHO COLOR FLOW MAPG: CPT | Performed by: INTERNAL MEDICINE

## 2019-01-01 PROCEDURE — 25010000002 GENTAMICIN PER 80 MG: Performed by: INTERNAL MEDICINE

## 2019-01-01 PROCEDURE — 82803 BLOOD GASES ANY COMBINATION: CPT

## 2019-01-01 PROCEDURE — 25010000002 ENOXAPARIN PER 10 MG: Performed by: INTERNAL MEDICINE

## 2019-01-01 PROCEDURE — 80069 RENAL FUNCTION PANEL: CPT | Performed by: INTERNAL MEDICINE

## 2019-01-01 PROCEDURE — 5A1945Z RESPIRATORY VENTILATION, 24-96 CONSECUTIVE HOURS: ICD-10-PCS | Performed by: HOSPITALIST

## 2019-01-01 PROCEDURE — 25010000002 ONDANSETRON PER 1 MG: Performed by: HOSPITALIST

## 2019-01-01 PROCEDURE — 83050 HGB METHEMOGLOBIN QUAN: CPT

## 2019-01-01 PROCEDURE — 99204 OFFICE O/P NEW MOD 45 MIN: CPT | Performed by: INTERNAL MEDICINE

## 2019-01-01 PROCEDURE — 25010000002 FUROSEMIDE PER 20 MG: Performed by: HOSPITALIST

## 2019-01-01 PROCEDURE — 25010000002 AMIODARONE IN DEXTROSE 5% 150-4.21 MG/100ML-% SOLUTION: Performed by: NURSE PRACTITIONER

## 2019-01-01 PROCEDURE — 25010000002 FUROSEMIDE PER 20 MG: Performed by: INTERNAL MEDICINE

## 2019-01-01 PROCEDURE — 85610 PROTHROMBIN TIME: CPT | Performed by: INTERNAL MEDICINE

## 2019-01-01 PROCEDURE — 02HK3KZ INSERTION OF DEFIBRILLATOR LEAD INTO RIGHT VENTRICLE, PERCUTANEOUS APPROACH: ICD-10-PCS | Performed by: INTERNAL MEDICINE

## 2019-01-01 PROCEDURE — 94002 VENT MGMT INPAT INIT DAY: CPT

## 2019-01-01 PROCEDURE — C1892 INTRO/SHEATH,FIXED,PEEL-AWAY: HCPCS | Performed by: INTERNAL MEDICINE

## 2019-01-01 PROCEDURE — 80053 COMPREHEN METABOLIC PANEL: CPT | Performed by: HOSPITALIST

## 2019-01-01 PROCEDURE — 25010000002 ALBUMIN HUMAN 5% PER 50 ML

## 2019-01-01 PROCEDURE — P9041 ALBUMIN (HUMAN),5%, 50ML: HCPCS | Performed by: INTERNAL MEDICINE

## 2019-01-01 PROCEDURE — 93321 DOPPLER ECHO F-UP/LMTD STD: CPT

## 2019-01-01 PROCEDURE — 25010000002 AMIODARONE PER 30 MG: Performed by: NURSE ANESTHETIST, CERTIFIED REGISTERED

## 2019-01-01 PROCEDURE — 0BH17EZ INSERTION OF ENDOTRACHEAL AIRWAY INTO TRACHEA, VIA NATURAL OR ARTIFICIAL OPENING: ICD-10-PCS | Performed by: HOSPITALIST

## 2019-01-01 PROCEDURE — 5A02210 ASSISTANCE WITH CARDIAC OUTPUT USING BALLOON PUMP, CONTINUOUS: ICD-10-PCS | Performed by: INTERNAL MEDICINE

## 2019-01-01 PROCEDURE — 25010000002 LORAZEPAM PER 2 MG: Performed by: INTERNAL MEDICINE

## 2019-01-01 PROCEDURE — 82805 BLOOD GASES W/O2 SATURATION: CPT

## 2019-01-01 PROCEDURE — 33967 INSERT I-AORT PERCUT DEVICE: CPT | Performed by: INTERNAL MEDICINE

## 2019-01-01 PROCEDURE — 93321 DOPPLER ECHO F-UP/LMTD STD: CPT | Performed by: INTERNAL MEDICINE

## 2019-01-01 PROCEDURE — 25010000002 MORPHINE PER 10 MG: Performed by: FAMILY MEDICINE

## 2019-01-01 PROCEDURE — 25010000002 FENTANYL CITRATE (PF) 100 MCG/2ML SOLUTION: Performed by: INTERNAL MEDICINE

## 2019-01-01 PROCEDURE — 94003 VENT MGMT INPAT SUBQ DAY: CPT

## 2019-01-01 PROCEDURE — 25010000002 MIDAZOLAM PER 1 MG: Performed by: NURSE PRACTITIONER

## 2019-01-01 PROCEDURE — 25010000003 HYDROXYZINE PER 25 MG: Performed by: FAMILY MEDICINE

## 2019-01-01 PROCEDURE — 93325 DOPPLER ECHO COLOR FLOW MAPG: CPT

## 2019-01-01 PROCEDURE — 25010000002 ALBUMIN HUMAN 5% PER 50 ML: Performed by: INTERNAL MEDICINE

## 2019-01-01 PROCEDURE — 25010000002 GENTAMICIN PER 80 MG: Performed by: NURSE ANESTHETIST, CERTIFIED REGISTERED

## 2019-01-01 PROCEDURE — C1895 LEAD, AICD, ENDO DUAL COIL: HCPCS | Performed by: INTERNAL MEDICINE

## 2019-01-01 PROCEDURE — 93308 TTE F-UP OR LMTD: CPT | Performed by: INTERNAL MEDICINE

## 2019-01-01 PROCEDURE — 93010 ELECTROCARDIOGRAM REPORT: CPT | Performed by: INTERNAL MEDICINE

## 2019-01-01 PROCEDURE — 25010000002 SUCCINYLCHOLINE PER 20 MG: Performed by: NURSE ANESTHETIST, CERTIFIED REGISTERED

## 2019-01-01 PROCEDURE — 85007 BL SMEAR W/DIFF WBC COUNT: CPT | Performed by: HOSPITALIST

## 2019-01-01 PROCEDURE — 25010000003 CEFAZOLIN PER 500 MG: Performed by: NURSE ANESTHETIST, CERTIFIED REGISTERED

## 2019-01-01 PROCEDURE — 25010000002 ONDANSETRON PER 1 MG: Performed by: FAMILY MEDICINE

## 2019-01-01 PROCEDURE — 25010000002 ENOXAPARIN PER 10 MG: Performed by: HOSPITALIST

## 2019-01-01 PROCEDURE — 0DH67UZ INSERTION OF FEEDING DEVICE INTO STOMACH, VIA NATURAL OR ARTIFICIAL OPENING: ICD-10-PCS | Performed by: FAMILY MEDICINE

## 2019-01-01 PROCEDURE — 87205 SMEAR GRAM STAIN: CPT | Performed by: FAMILY MEDICINE

## 2019-01-01 PROCEDURE — 93306 TTE W/DOPPLER COMPLETE: CPT

## 2019-01-01 PROCEDURE — 25010000002 EPINEPHRINE 1 MG/ML SOLUTION 30 ML VIAL: Performed by: FAMILY MEDICINE

## 2019-01-01 PROCEDURE — 25010000002 PROPOFOL 10 MG/ML EMULSION: Performed by: NURSE ANESTHETIST, CERTIFIED REGISTERED

## 2019-01-01 PROCEDURE — C1769 GUIDE WIRE: HCPCS | Performed by: INTERNAL MEDICINE

## 2019-01-01 PROCEDURE — 94003 VENT MGMT INPAT SUBQ DAY: CPT | Performed by: INTERNAL MEDICINE

## 2019-01-01 PROCEDURE — 93458 L HRT ARTERY/VENTRICLE ANGIO: CPT | Performed by: INTERNAL MEDICINE

## 2019-01-01 PROCEDURE — 80048 BASIC METABOLIC PNL TOTAL CA: CPT | Performed by: INTERNAL MEDICINE

## 2019-01-01 PROCEDURE — 87070 CULTURE OTHR SPECIMN AEROBIC: CPT | Performed by: FAMILY MEDICINE

## 2019-01-01 PROCEDURE — 25010000002 PHENYLEPHRINE PER 1 ML: Performed by: NURSE ANESTHETIST, CERTIFIED REGISTERED

## 2019-01-01 PROCEDURE — C1751 CATH, INF, PER/CENT/MIDLINE: HCPCS | Performed by: ANESTHESIOLOGY

## 2019-01-01 PROCEDURE — C1721 AICD, DUAL CHAMBER: HCPCS | Performed by: INTERNAL MEDICINE

## 2019-01-01 PROCEDURE — 93308 TTE F-UP OR LMTD: CPT

## 2019-01-01 PROCEDURE — 25010000002 MIDAZOLAM PER 1 MG: Performed by: NURSE ANESTHETIST, CERTIFIED REGISTERED

## 2019-01-01 DEVICE — ICD FORTIFY ASSURA NEXTGEN DR DF4 CONN: Type: IMPLANTABLE DEVICE | Status: FUNCTIONAL

## 2019-01-01 DEVICE — LD DEFIB DURATA SJ4 58CM 7120Q58: Type: IMPLANTABLE DEVICE | Status: FUNCTIONAL

## 2019-01-01 DEVICE — LD PM TENDRIL STS 6F46CM 2088TC46: Type: IMPLANTABLE DEVICE | Status: FUNCTIONAL

## 2019-01-01 RX ORDER — NOREPINEPHRINE BIT/0.9 % NACL 8 MG/250ML
.02-.3 INFUSION BOTTLE (ML) INTRAVENOUS
Status: DISCONTINUED | OUTPATIENT
Start: 2019-01-01 | End: 2019-01-01

## 2019-01-01 RX ORDER — LIDOCAINE HYDROCHLORIDE 20 MG/ML
INJECTION, SOLUTION INFILTRATION; PERINEURAL AS NEEDED
Status: DISCONTINUED | OUTPATIENT
Start: 2019-01-01 | End: 2019-01-01 | Stop reason: HOSPADM

## 2019-01-01 RX ORDER — PROPOFOL 10 MG/ML
VIAL (ML) INTRAVENOUS AS NEEDED
Status: DISCONTINUED | OUTPATIENT
Start: 2019-01-01 | End: 2019-01-01 | Stop reason: SURG

## 2019-01-01 RX ORDER — ZIPRASIDONE MESYLATE 20 MG/ML
20 INJECTION, POWDER, LYOPHILIZED, FOR SOLUTION INTRAMUSCULAR ONCE
Status: COMPLETED | OUTPATIENT
Start: 2019-01-01 | End: 2019-01-01

## 2019-01-01 RX ORDER — ALBUMIN, HUMAN INJ 5% 5 %
SOLUTION INTRAVENOUS
Status: COMPLETED
Start: 2019-01-01 | End: 2019-01-01

## 2019-01-01 RX ORDER — SUCCINYLCHOLINE CHLORIDE 20 MG/ML
INJECTION INTRAMUSCULAR; INTRAVENOUS AS NEEDED
Status: DISCONTINUED | OUTPATIENT
Start: 2019-01-01 | End: 2019-01-01 | Stop reason: SURG

## 2019-01-01 RX ORDER — MIDAZOLAM HYDROCHLORIDE 1 MG/ML
2 INJECTION INTRAMUSCULAR; INTRAVENOUS ONCE
Status: DISCONTINUED | OUTPATIENT
Start: 2019-01-01 | End: 2019-01-01

## 2019-01-01 RX ORDER — LORAZEPAM 2 MG/ML
2 INJECTION INTRAMUSCULAR
Status: DISCONTINUED | OUTPATIENT
Start: 2019-01-01 | End: 2019-01-01

## 2019-01-01 RX ORDER — FUROSEMIDE 10 MG/ML
20 INJECTION INTRAMUSCULAR; INTRAVENOUS EVERY 12 HOURS
Status: DISCONTINUED | OUTPATIENT
Start: 2019-01-01 | End: 2019-01-01

## 2019-01-01 RX ORDER — HYDROXYZINE HYDROCHLORIDE 25 MG/ML
25 INJECTION, SOLUTION INTRAMUSCULAR ONCE
Status: COMPLETED | OUTPATIENT
Start: 2019-01-01 | End: 2019-01-01

## 2019-01-01 RX ORDER — FENTANYL CITRATE 50 UG/ML
25 INJECTION, SOLUTION INTRAMUSCULAR; INTRAVENOUS
Status: DISCONTINUED | OUTPATIENT
Start: 2019-01-01 | End: 2019-01-01

## 2019-01-01 RX ORDER — MIDAZOLAM HYDROCHLORIDE 1 MG/ML
2 INJECTION INTRAMUSCULAR; INTRAVENOUS
Status: DISCONTINUED | OUTPATIENT
Start: 2019-01-01 | End: 2019-01-01

## 2019-01-01 RX ORDER — VECURONIUM BROMIDE 1 MG/ML
100 INJECTION, POWDER, LYOPHILIZED, FOR SOLUTION INTRAVENOUS ONCE
Status: DISCONTINUED | OUTPATIENT
Start: 2019-01-01 | End: 2019-01-01

## 2019-01-01 RX ORDER — ONDANSETRON 2 MG/ML
4 INJECTION INTRAMUSCULAR; INTRAVENOUS ONCE
Status: COMPLETED | OUTPATIENT
Start: 2019-01-01 | End: 2019-01-01

## 2019-01-01 RX ORDER — SCOLOPAMINE TRANSDERMAL SYSTEM 1 MG/1
1 PATCH, EXTENDED RELEASE TRANSDERMAL
Status: DISCONTINUED | OUTPATIENT
Start: 2019-01-01 | End: 2019-01-01 | Stop reason: HOSPADM

## 2019-01-01 RX ORDER — HEPARIN SODIUM 5000 [USP'U]/ML
30 INJECTION, SOLUTION INTRAVENOUS; SUBCUTANEOUS AS NEEDED
Status: DISCONTINUED | OUTPATIENT
Start: 2019-01-01 | End: 2019-01-01

## 2019-01-01 RX ORDER — SODIUM CHLORIDE 0.9 % (FLUSH) 0.9 %
10 SYRINGE (ML) INJECTION AS NEEDED
Status: DISCONTINUED | OUTPATIENT
Start: 2019-01-01 | End: 2019-01-01

## 2019-01-01 RX ORDER — SODIUM CHLORIDE 0.9 % (FLUSH) 0.9 %
10 SYRINGE (ML) INJECTION EVERY 12 HOURS SCHEDULED
Status: DISCONTINUED | OUTPATIENT
Start: 2019-01-01 | End: 2019-01-01 | Stop reason: HOSPADM

## 2019-01-01 RX ORDER — LORAZEPAM 2 MG/ML
0.5 INJECTION INTRAMUSCULAR ONCE
Status: COMPLETED | OUTPATIENT
Start: 2019-01-01 | End: 2019-01-01

## 2019-01-01 RX ORDER — POTASSIUM CHLORIDE 750 MG/1
40 CAPSULE, EXTENDED RELEASE ORAL ONCE
Status: COMPLETED | OUTPATIENT
Start: 2019-01-01 | End: 2019-01-01

## 2019-01-01 RX ORDER — ALPRAZOLAM 0.5 MG/1
0.5 TABLET ORAL 3 TIMES DAILY PRN
Status: DISCONTINUED | OUTPATIENT
Start: 2019-01-01 | End: 2019-01-01

## 2019-01-01 RX ORDER — HEPARIN SODIUM 5000 [USP'U]/ML
4000 INJECTION, SOLUTION INTRAVENOUS; SUBCUTANEOUS AS NEEDED
Status: DISCONTINUED | OUTPATIENT
Start: 2019-01-01 | End: 2019-01-01

## 2019-01-01 RX ORDER — HEPARIN SODIUM 1000 [USP'U]/ML
INJECTION, SOLUTION INTRAVENOUS; SUBCUTANEOUS AS NEEDED
Status: DISCONTINUED | OUTPATIENT
Start: 2019-01-01 | End: 2019-01-01 | Stop reason: HOSPADM

## 2019-01-01 RX ORDER — ATORVASTATIN CALCIUM 10 MG/1
10 TABLET, FILM COATED ORAL NIGHTLY
Status: DISCONTINUED | OUTPATIENT
Start: 2019-01-01 | End: 2019-01-01

## 2019-01-01 RX ORDER — HYDROXYZINE HYDROCHLORIDE 25 MG/ML
25 INJECTION, SOLUTION INTRAMUSCULAR EVERY 6 HOURS PRN
Status: DISCONTINUED | OUTPATIENT
Start: 2019-01-01 | End: 2019-01-01

## 2019-01-01 RX ORDER — LORAZEPAM 1 MG/1
1 TABLET ORAL
Status: DISCONTINUED | OUTPATIENT
Start: 2019-01-01 | End: 2019-01-01

## 2019-01-01 RX ORDER — LORAZEPAM 2 MG/ML
2 INJECTION INTRAMUSCULAR
Status: DISCONTINUED | OUTPATIENT
Start: 2019-01-01 | End: 2019-01-01 | Stop reason: HOSPADM

## 2019-01-01 RX ORDER — NICOTINE 21 MG/24HR
1 PATCH, TRANSDERMAL 24 HOURS TRANSDERMAL
Status: DISCONTINUED | OUTPATIENT
Start: 2019-01-01 | End: 2019-01-01 | Stop reason: HOSPADM

## 2019-01-01 RX ORDER — HALOPERIDOL 5 MG/ML
5 INJECTION INTRAMUSCULAR EVERY 12 HOURS PRN
Status: DISCONTINUED | OUTPATIENT
Start: 2019-01-01 | End: 2019-01-01 | Stop reason: HOSPADM

## 2019-01-01 RX ORDER — PANTOPRAZOLE SODIUM 40 MG/10ML
40 INJECTION, POWDER, LYOPHILIZED, FOR SOLUTION INTRAVENOUS ONCE
Status: COMPLETED | OUTPATIENT
Start: 2019-01-01 | End: 2019-01-01

## 2019-01-01 RX ORDER — CEFAZOLIN SODIUM 1 G/3ML
INJECTION, POWDER, FOR SOLUTION INTRAMUSCULAR; INTRAVENOUS AS NEEDED
Status: DISCONTINUED | OUTPATIENT
Start: 2019-01-01 | End: 2019-01-01 | Stop reason: SURG

## 2019-01-01 RX ORDER — PANTOPRAZOLE SODIUM 40 MG/10ML
40 INJECTION, POWDER, LYOPHILIZED, FOR SOLUTION INTRAVENOUS
Status: DISCONTINUED | OUTPATIENT
Start: 2019-01-01 | End: 2019-01-01

## 2019-01-01 RX ORDER — FENTANYL CITRATE 50 UG/ML
INJECTION, SOLUTION INTRAMUSCULAR; INTRAVENOUS AS NEEDED
Status: DISCONTINUED | OUTPATIENT
Start: 2019-01-01 | End: 2019-01-01 | Stop reason: HOSPADM

## 2019-01-01 RX ORDER — SODIUM CHLORIDE 0.9 % (FLUSH) 0.9 %
10 SYRINGE (ML) INJECTION EVERY 12 HOURS SCHEDULED
Status: DISCONTINUED | OUTPATIENT
Start: 2019-01-01 | End: 2019-01-01

## 2019-01-01 RX ORDER — GENTAMICIN SULFATE 40 MG/ML
INJECTION, SOLUTION INTRAMUSCULAR; INTRAVENOUS AS NEEDED
Status: DISCONTINUED | OUTPATIENT
Start: 2019-01-01 | End: 2019-01-01 | Stop reason: SURG

## 2019-01-01 RX ORDER — ONDANSETRON 2 MG/ML
4 INJECTION INTRAMUSCULAR; INTRAVENOUS EVERY 6 HOURS PRN
Status: DISCONTINUED | OUTPATIENT
Start: 2019-01-01 | End: 2019-01-01

## 2019-01-01 RX ORDER — SPIRONOLACTONE 25 MG/1
25 TABLET ORAL DAILY
Status: DISCONTINUED | OUTPATIENT
Start: 2019-01-01 | End: 2019-01-01

## 2019-01-01 RX ORDER — ALBUMIN, HUMAN INJ 5% 5 %
250 SOLUTION INTRAVENOUS ONCE
Status: COMPLETED | OUTPATIENT
Start: 2019-01-01 | End: 2019-01-01

## 2019-01-01 RX ORDER — DOBUTAMINE HYDROCHLORIDE 100 MG/100ML
8 INJECTION INTRAVENOUS
Status: DISCONTINUED | OUTPATIENT
Start: 2019-01-01 | End: 2019-01-01

## 2019-01-01 RX ORDER — MORPHINE SULFATE 2 MG/ML
2 INJECTION, SOLUTION INTRAMUSCULAR; INTRAVENOUS
Status: DISCONTINUED | OUTPATIENT
Start: 2019-01-01 | End: 2019-01-01 | Stop reason: HOSPADM

## 2019-01-01 RX ORDER — MIDAZOLAM HYDROCHLORIDE 1 MG/ML
INJECTION INTRAMUSCULAR; INTRAVENOUS AS NEEDED
Status: DISCONTINUED | OUTPATIENT
Start: 2019-01-01 | End: 2019-01-01 | Stop reason: SURG

## 2019-01-01 RX ORDER — SODIUM CHLORIDE 9 MG/ML
INJECTION, SOLUTION INTRAVENOUS
Status: COMPLETED
Start: 2019-01-01 | End: 2019-01-01

## 2019-01-01 RX ORDER — SODIUM CHLORIDE 0.9 % (FLUSH) 0.9 %
10 SYRINGE (ML) INJECTION AS NEEDED
Status: DISCONTINUED | OUTPATIENT
Start: 2019-01-01 | End: 2019-01-01 | Stop reason: HOSPADM

## 2019-01-01 RX ORDER — MORPHINE SULFATE 2 MG/ML
2 INJECTION, SOLUTION INTRAMUSCULAR; INTRAVENOUS ONCE
Status: COMPLETED | OUTPATIENT
Start: 2019-01-01 | End: 2019-01-01

## 2019-01-01 RX ORDER — HEPARIN SODIUM 10000 [USP'U]/100ML
19 INJECTION, SOLUTION INTRAVENOUS
Status: DISCONTINUED | OUTPATIENT
Start: 2019-01-01 | End: 2019-01-01

## 2019-01-01 RX ORDER — LORAZEPAM 2 MG/ML
1 INJECTION INTRAMUSCULAR
Status: DISCONTINUED | OUTPATIENT
Start: 2019-01-01 | End: 2019-01-01

## 2019-01-01 RX ORDER — SODIUM CHLORIDE 9 MG/ML
100 INJECTION, SOLUTION INTRAVENOUS CONTINUOUS
Status: DISCONTINUED | OUTPATIENT
Start: 2019-01-01 | End: 2019-01-01

## 2019-01-01 RX ORDER — LORAZEPAM 2 MG/ML
4 INJECTION INTRAMUSCULAR
Status: DISCONTINUED | OUTPATIENT
Start: 2019-01-01 | End: 2019-01-01

## 2019-01-01 RX ORDER — FENTANYL CITRATE 50 UG/ML
INJECTION, SOLUTION INTRAMUSCULAR; INTRAVENOUS AS NEEDED
Status: DISCONTINUED | OUTPATIENT
Start: 2019-01-01 | End: 2019-01-01 | Stop reason: SURG

## 2019-01-01 RX ORDER — SODIUM CHLORIDE 0.9 % (FLUSH) 0.9 %
3 SYRINGE (ML) INJECTION EVERY 12 HOURS SCHEDULED
Status: DISCONTINUED | OUTPATIENT
Start: 2019-01-01 | End: 2019-01-01

## 2019-01-01 RX ORDER — FUROSEMIDE 10 MG/ML
20 INJECTION INTRAMUSCULAR; INTRAVENOUS ONCE
Status: COMPLETED | OUTPATIENT
Start: 2019-01-01 | End: 2019-01-01

## 2019-01-01 RX ORDER — LOSARTAN POTASSIUM 25 MG/1
25 TABLET ORAL
Status: DISCONTINUED | OUTPATIENT
Start: 2019-01-01 | End: 2019-01-01

## 2019-01-01 RX ORDER — SODIUM CHLORIDE 9 MG/ML
75 INJECTION, SOLUTION INTRAVENOUS CONTINUOUS
Status: DISCONTINUED | OUTPATIENT
Start: 2019-01-01 | End: 2019-01-01

## 2019-01-01 RX ORDER — MIDAZOLAM HYDROCHLORIDE 1 MG/ML
INJECTION INTRAMUSCULAR; INTRAVENOUS AS NEEDED
Status: DISCONTINUED | OUTPATIENT
Start: 2019-01-01 | End: 2019-01-01 | Stop reason: HOSPADM

## 2019-01-01 RX ORDER — SODIUM CHLORIDE 0.9 % (FLUSH) 0.9 %
20 SYRINGE (ML) INJECTION AS NEEDED
Status: DISCONTINUED | OUTPATIENT
Start: 2019-01-01 | End: 2019-01-01 | Stop reason: HOSPADM

## 2019-01-01 RX ORDER — LORAZEPAM 2 MG/1
2 TABLET ORAL
Status: DISCONTINUED | OUTPATIENT
Start: 2019-01-01 | End: 2019-01-01

## 2019-01-01 RX ORDER — ASPIRIN 81 MG/1
81 TABLET ORAL DAILY
Status: DISCONTINUED | OUTPATIENT
Start: 2019-01-01 | End: 2019-01-01

## 2019-01-01 RX ORDER — MIDAZOLAM HYDROCHLORIDE 1 MG/ML
INJECTION INTRAMUSCULAR; INTRAVENOUS
Status: DISPENSED
Start: 2019-01-01 | End: 2019-01-01

## 2019-01-01 RX ORDER — AMIODARONE HYDROCHLORIDE 50 MG/ML
INJECTION, SOLUTION INTRAVENOUS AS NEEDED
Status: DISCONTINUED | OUTPATIENT
Start: 2019-01-01 | End: 2019-01-01 | Stop reason: SURG

## 2019-01-01 RX ORDER — LORAZEPAM 2 MG/1
4 TABLET ORAL
Status: DISCONTINUED | OUTPATIENT
Start: 2019-01-01 | End: 2019-01-01

## 2019-01-01 RX ORDER — NALOXONE HYDROCHLORIDE 1 MG/ML
2 INJECTION INTRAMUSCULAR; INTRAVENOUS; SUBCUTANEOUS ONCE
Status: COMPLETED | OUTPATIENT
Start: 2019-01-01 | End: 2019-01-01

## 2019-01-01 RX ORDER — LORAZEPAM 2 MG/ML
0.5 INJECTION INTRAMUSCULAR EVERY 4 HOURS PRN
Status: DISCONTINUED | OUTPATIENT
Start: 2019-01-01 | End: 2019-01-01

## 2019-01-01 RX ORDER — MORPHINE SULFATE 2 MG/ML
1 INJECTION, SOLUTION INTRAMUSCULAR; INTRAVENOUS EVERY 6 HOURS PRN
Status: DISCONTINUED | OUTPATIENT
Start: 2019-01-01 | End: 2019-01-01

## 2019-01-01 RX ADMIN — NICOTINE 1 PATCH: 21 PATCH, EXTENDED RELEASE TRANSDERMAL at 08:14

## 2019-01-01 RX ADMIN — HEPARIN SODIUM 11 UNITS/KG/HR: 10000 INJECTION, SOLUTION INTRAVENOUS at 16:00

## 2019-01-01 RX ADMIN — HEPARIN SODIUM 2200 UNITS: 5000 INJECTION, SOLUTION INTRAVENOUS; SUBCUTANEOUS at 07:17

## 2019-01-01 RX ADMIN — METOPROLOL TARTRATE 12.5 MG: 25 TABLET, FILM COATED ORAL at 09:32

## 2019-01-01 RX ADMIN — ENOXAPARIN SODIUM 40 MG: 40 INJECTION SUBCUTANEOUS at 09:54

## 2019-01-01 RX ADMIN — MINERAL OIL, PETROLATUM: 425; 573 OINTMENT OPHTHALMIC at 05:52

## 2019-01-01 RX ADMIN — AMIODARONE HYDROCHLORIDE 150 MG: 1.5 INJECTION, SOLUTION INTRAVENOUS at 09:11

## 2019-01-01 RX ADMIN — ALBUMIN HUMAN 250 ML: 0.05 INJECTION, SOLUTION INTRAVENOUS at 19:33

## 2019-01-01 RX ADMIN — MORPHINE SULFATE 2 MG: 2 INJECTION, SOLUTION INTRAMUSCULAR; INTRAVENOUS at 03:54

## 2019-01-01 RX ADMIN — PROPOFOL 40 MG: 10 INJECTION, EMULSION INTRAVENOUS at 12:04

## 2019-01-01 RX ADMIN — LORAZEPAM 2 MG: 2 INJECTION INTRAMUSCULAR; INTRAVENOUS at 22:48

## 2019-01-01 RX ADMIN — MIDAZOLAM 2 MG: 1 INJECTION INTRAMUSCULAR; INTRAVENOUS at 09:47

## 2019-01-01 RX ADMIN — Medication 400 MG: at 20:01

## 2019-01-01 RX ADMIN — FUROSEMIDE 20 MG: 10 INJECTION, SOLUTION INTRAVENOUS at 10:57

## 2019-01-01 RX ADMIN — LORAZEPAM 6 MG/HR: 2 INJECTION INTRAMUSCULAR; INTRAVENOUS at 20:18

## 2019-01-01 RX ADMIN — SODIUM CHLORIDE, PRESERVATIVE FREE 10 ML: 5 INJECTION INTRAVENOUS at 20:50

## 2019-01-01 RX ADMIN — ALBUMIN HUMAN 12.5 G: 0.05 INJECTION, SOLUTION INTRAVENOUS at 15:30

## 2019-01-01 RX ADMIN — LORAZEPAM 8.5 MG/HR: 2 INJECTION INTRAMUSCULAR; INTRAVENOUS at 04:07

## 2019-01-01 RX ADMIN — AMIODARONE HYDROCHLORIDE 150 MG: 50 INJECTION, SOLUTION INTRAVENOUS at 11:40

## 2019-01-01 RX ADMIN — FENTANYL CITRATE 25 MCG: 50 INJECTION INTRAMUSCULAR; INTRAVENOUS at 07:04

## 2019-01-01 RX ADMIN — DOBUTAMINE HYDROCHLORIDE 2.5 MCG/KG/MIN: 100 INJECTION INTRAVENOUS at 05:17

## 2019-01-01 RX ADMIN — HYDROXYZINE HYDROCHLORIDE 25 MG: 25 INJECTION, SOLUTION INTRAMUSCULAR at 10:46

## 2019-01-01 RX ADMIN — FENTANYL CITRATE 25 MCG: 50 INJECTION INTRAMUSCULAR; INTRAVENOUS at 16:36

## 2019-01-01 RX ADMIN — SODIUM CHLORIDE, PRESERVATIVE FREE 10 ML: 5 INJECTION INTRAVENOUS at 09:08

## 2019-01-01 RX ADMIN — EPINEPHRINE 0.3 MCG/KG/MIN: 1 INJECTION PARENTERAL at 15:40

## 2019-01-01 RX ADMIN — SODIUM CHLORIDE, PRESERVATIVE FREE 10 ML: 5 INJECTION INTRAVENOUS at 20:03

## 2019-01-01 RX ADMIN — MINERAL OIL, PETROLATUM: 425; 573 OINTMENT OPHTHALMIC at 21:30

## 2019-01-01 RX ADMIN — MORPHINE SULFATE 2 MG: 2 INJECTION, SOLUTION INTRAMUSCULAR; INTRAVENOUS at 20:18

## 2019-01-01 RX ADMIN — FUROSEMIDE 20 MG: 10 INJECTION, SOLUTION INTRAMUSCULAR; INTRAVENOUS at 05:58

## 2019-01-01 RX ADMIN — SODIUM CHLORIDE 75 ML/HR: 9 INJECTION, SOLUTION INTRAVENOUS at 07:35

## 2019-01-01 RX ADMIN — MORPHINE SULFATE 2 MG: 2 INJECTION, SOLUTION INTRAMUSCULAR; INTRAVENOUS at 17:32

## 2019-01-01 RX ADMIN — LORAZEPAM 2 MG: 2 INJECTION INTRAMUSCULAR; INTRAVENOUS at 03:54

## 2019-01-01 RX ADMIN — DOBUTAMINE HYDROCHLORIDE 8 MCG/KG/MIN: 100 INJECTION INTRAVENOUS at 12:17

## 2019-01-01 RX ADMIN — FENTANYL CITRATE 25 MCG: 50 INJECTION INTRAMUSCULAR; INTRAVENOUS at 21:19

## 2019-01-01 RX ADMIN — ONDANSETRON 4 MG: 2 INJECTION INTRAMUSCULAR; INTRAVENOUS at 08:30

## 2019-01-01 RX ADMIN — Medication 400 MG: at 09:54

## 2019-01-01 RX ADMIN — FENTANYL CITRATE 25 MCG: 50 INJECTION INTRAMUSCULAR; INTRAVENOUS at 13:14

## 2019-01-01 RX ADMIN — AMIODARONE HYDROCHLORIDE 0.5 MG/MIN: 1.8 INJECTION, SOLUTION INTRAVENOUS at 02:16

## 2019-01-01 RX ADMIN — SODIUM BICARBONATE 150 MEQ: 84 INJECTION INTRAVENOUS at 21:30

## 2019-01-01 RX ADMIN — POTASSIUM CHLORIDE 40 MEQ: 750 CAPSULE, EXTENDED RELEASE ORAL at 08:56

## 2019-01-01 RX ADMIN — LORAZEPAM 2 MG: 2 INJECTION INTRAMUSCULAR; INTRAVENOUS at 19:38

## 2019-01-01 RX ADMIN — MORPHINE SULFATE 2 MG: 2 INJECTION, SOLUTION INTRAMUSCULAR; INTRAVENOUS at 03:14

## 2019-01-01 RX ADMIN — FENTANYL CITRATE 25 MCG: 50 INJECTION INTRAMUSCULAR; INTRAVENOUS at 03:51

## 2019-01-01 RX ADMIN — DOBUTAMINE HYDROCHLORIDE 8 MCG/KG/MIN: 100 INJECTION INTRAVENOUS at 03:05

## 2019-01-01 RX ADMIN — PHENYLEPHRINE HYDROCHLORIDE 100 MG: 10 INJECTION INTRAVENOUS at 12:22

## 2019-01-01 RX ADMIN — PHENYLEPHRINE HYDROCHLORIDE 100 MG: 10 INJECTION INTRAVENOUS at 12:27

## 2019-01-01 RX ADMIN — ONDANSETRON 4 MG: 2 INJECTION INTRAMUSCULAR; INTRAVENOUS at 05:43

## 2019-01-01 RX ADMIN — PANTOPRAZOLE SODIUM 40 MG: 40 INJECTION, POWDER, FOR SOLUTION INTRAVENOUS at 09:55

## 2019-01-01 RX ADMIN — DOBUTAMINE HYDROCHLORIDE 5 MCG/KG/MIN: 100 INJECTION INTRAVENOUS at 12:40

## 2019-01-01 RX ADMIN — ONDANSETRON 4 MG: 2 INJECTION INTRAMUSCULAR; INTRAVENOUS at 12:00

## 2019-01-01 RX ADMIN — VASOPRESSIN 0.03 UNITS/MIN: 20 INJECTION INTRAVENOUS at 16:30

## 2019-01-01 RX ADMIN — MORPHINE SULFATE 2 MG: 2 INJECTION, SOLUTION INTRAMUSCULAR; INTRAVENOUS at 01:11

## 2019-01-01 RX ADMIN — SODIUM CHLORIDE 100 ML/HR: 900 INJECTION, SOLUTION INTRAVENOUS at 12:14

## 2019-01-01 RX ADMIN — Medication 150 MEQ: at 21:30

## 2019-01-01 RX ADMIN — CALCIUM CHLORIDE 1 G: 100 INJECTION, SOLUTION INTRAVENOUS; INTRAVENTRICULAR at 21:03

## 2019-01-01 RX ADMIN — FENTANYL CITRATE 25 MCG: 50 INJECTION INTRAMUSCULAR; INTRAVENOUS at 20:38

## 2019-01-01 RX ADMIN — GENTAMICIN SULFATE 80 MG: 40 INJECTION, SOLUTION INTRAMUSCULAR; INTRAVENOUS at 19:44

## 2019-01-01 RX ADMIN — PHENYLEPHRINE HYDROCHLORIDE 100 MCG: 10 INJECTION INTRAVENOUS at 12:40

## 2019-01-01 RX ADMIN — FENTANYL CITRATE 25 MCG: 50 INJECTION INTRAMUSCULAR; INTRAVENOUS at 03:09

## 2019-01-01 RX ADMIN — ALPRAZOLAM 0.5 MG: 0.5 TABLET ORAL at 03:15

## 2019-01-01 RX ADMIN — SODIUM CHLORIDE, PRESERVATIVE FREE 10 ML: 5 INJECTION INTRAVENOUS at 17:23

## 2019-01-01 RX ADMIN — SODIUM CHLORIDE, PRESERVATIVE FREE 10 ML: 5 INJECTION INTRAVENOUS at 08:57

## 2019-01-01 RX ADMIN — PHENYLEPHRINE HYDROCHLORIDE 100 MCG: 10 INJECTION INTRAVENOUS at 12:46

## 2019-01-01 RX ADMIN — SUCCINYLCHOLINE CHLORIDE 100 MG: 20 INJECTION, SOLUTION INTRAMUSCULAR; INTRAVENOUS at 14:25

## 2019-01-01 RX ADMIN — FENTANYL CITRATE 25 MCG: 50 INJECTION INTRAMUSCULAR; INTRAVENOUS at 10:57

## 2019-01-01 RX ADMIN — SODIUM CHLORIDE, PRESERVATIVE FREE 10 ML: 5 INJECTION INTRAVENOUS at 21:14

## 2019-01-01 RX ADMIN — SODIUM CHLORIDE 500 ML: 9 INJECTION, SOLUTION INTRAVENOUS at 19:35

## 2019-01-01 RX ADMIN — SODIUM BICARBONATE 100 MEQ: 84 INJECTION, SOLUTION INTRAVENOUS at 20:04

## 2019-01-01 RX ADMIN — AMIODARONE HYDROCHLORIDE 1 MG/MIN: 1.8 INJECTION, SOLUTION INTRAVENOUS at 09:25

## 2019-01-01 RX ADMIN — Medication 400 MG: at 20:49

## 2019-01-01 RX ADMIN — LORAZEPAM 2 MG: 2 INJECTION INTRAMUSCULAR; INTRAVENOUS at 23:58

## 2019-01-01 RX ADMIN — NICOTINE 1 PATCH: 21 PATCH, EXTENDED RELEASE TRANSDERMAL at 09:13

## 2019-01-01 RX ADMIN — FUROSEMIDE 20 MG: 10 INJECTION, SOLUTION INTRAMUSCULAR; INTRAVENOUS at 05:34

## 2019-01-01 RX ADMIN — VECURONIUM BROMIDE 0.6 MCG/KG/MIN: 1 INJECTION, POWDER, LYOPHILIZED, FOR SOLUTION INTRAVENOUS at 17:07

## 2019-01-01 RX ADMIN — SODIUM CHLORIDE, PRESERVATIVE FREE 10 ML: 5 INJECTION INTRAVENOUS at 20:39

## 2019-01-01 RX ADMIN — FENTANYL CITRATE 25 MCG: 50 INJECTION INTRAMUSCULAR; INTRAVENOUS at 01:35

## 2019-01-01 RX ADMIN — PHENYLEPHRINE HYDROCHLORIDE 100 MCG: 10 INJECTION INTRAVENOUS at 12:35

## 2019-01-01 RX ADMIN — FENTANYL CITRATE 25 MCG: 50 INJECTION INTRAMUSCULAR; INTRAVENOUS at 20:08

## 2019-01-01 RX ADMIN — MORPHINE SULFATE 2 MG: 2 INJECTION, SOLUTION INTRAMUSCULAR; INTRAVENOUS at 18:18

## 2019-01-01 RX ADMIN — FENTANYL CITRATE 25 MCG: 50 INJECTION INTRAMUSCULAR; INTRAVENOUS at 16:28

## 2019-01-01 RX ADMIN — DOBUTAMINE HYDROCHLORIDE 15 MCG/KG/MIN: 100 INJECTION INTRAVENOUS at 16:14

## 2019-01-01 RX ADMIN — LORAZEPAM 2 MG: 2 INJECTION INTRAMUSCULAR; INTRAVENOUS at 20:18

## 2019-01-01 RX ADMIN — SODIUM CHLORIDE: 900 INJECTION, SOLUTION INTRAVENOUS at 11:17

## 2019-01-01 RX ADMIN — SODIUM CHLORIDE, PRESERVATIVE FREE 10 ML: 5 INJECTION INTRAVENOUS at 09:56

## 2019-01-01 RX ADMIN — ATORVASTATIN CALCIUM 10 MG: 10 TABLET, FILM COATED ORAL at 20:49

## 2019-01-01 RX ADMIN — Medication 400 MG: at 09:32

## 2019-01-01 RX ADMIN — LORAZEPAM 1 MG/HR: 2 INJECTION INTRAMUSCULAR; INTRAVENOUS at 16:11

## 2019-01-01 RX ADMIN — LORAZEPAM 2 MG: 2 INJECTION INTRAMUSCULAR; INTRAVENOUS at 18:18

## 2019-01-01 RX ADMIN — NICOTINE 1 PATCH: 21 PATCH, EXTENDED RELEASE TRANSDERMAL at 10:06

## 2019-01-01 RX ADMIN — FUROSEMIDE 20 MG: 10 INJECTION, SOLUTION INTRAMUSCULAR; INTRAVENOUS at 18:10

## 2019-01-01 RX ADMIN — MIDAZOLAM 2 MG: 1 INJECTION INTRAMUSCULAR; INTRAVENOUS at 12:07

## 2019-01-01 RX ADMIN — NICOTINE 1 PATCH: 21 PATCH, EXTENDED RELEASE TRANSDERMAL at 15:55

## 2019-01-01 RX ADMIN — LORAZEPAM 0.5 MG: 2 INJECTION, SOLUTION INTRAMUSCULAR; INTRAVENOUS at 14:29

## 2019-01-01 RX ADMIN — FENTANYL CITRATE 25 MCG: 50 INJECTION INTRAMUSCULAR; INTRAVENOUS at 18:24

## 2019-01-01 RX ADMIN — FENTANYL CITRATE 25 MCG: 50 INJECTION INTRAMUSCULAR; INTRAVENOUS at 00:13

## 2019-01-01 RX ADMIN — HYDROXYZINE HYDROCHLORIDE 25 MG: 25 INJECTION, SOLUTION INTRAMUSCULAR at 19:22

## 2019-01-01 RX ADMIN — PROPOFOL 20 MG: 10 INJECTION, EMULSION INTRAVENOUS at 12:02

## 2019-01-01 RX ADMIN — ATORVASTATIN CALCIUM 10 MG: 10 TABLET, FILM COATED ORAL at 20:01

## 2019-01-01 RX ADMIN — MIDAZOLAM HYDROCHLORIDE 1 MG: 2 INJECTION, SOLUTION INTRAMUSCULAR; INTRAVENOUS at 11:20

## 2019-01-01 RX ADMIN — NOREPINEPHRINE BITARTRATE 0.5 MCG/KG/MIN: 1 INJECTION, SOLUTION, CONCENTRATE INTRAVENOUS at 18:09

## 2019-01-01 RX ADMIN — MIDAZOLAM HYDROCHLORIDE 1 MG: 2 INJECTION, SOLUTION INTRAMUSCULAR; INTRAVENOUS at 12:00

## 2019-01-01 RX ADMIN — LORAZEPAM 2 MG: 2 INJECTION INTRAMUSCULAR; INTRAVENOUS at 17:30

## 2019-01-01 RX ADMIN — Medication 400 MG: at 20:38

## 2019-01-01 RX ADMIN — HEPARIN SODIUM 2200 UNITS: 5000 INJECTION, SOLUTION INTRAVENOUS; SUBCUTANEOUS at 23:34

## 2019-01-01 RX ADMIN — LORAZEPAM 2 MG: 2 INJECTION INTRAMUSCULAR; INTRAVENOUS at 03:14

## 2019-01-01 RX ADMIN — FENTANYL CITRATE 25 MCG: 50 INJECTION INTRAMUSCULAR; INTRAVENOUS at 06:12

## 2019-01-01 RX ADMIN — GENTAMICIN SULFATE 80 MG: 40 INJECTION, SOLUTION INTRAMUSCULAR; INTRAVENOUS at 11:51

## 2019-01-01 RX ADMIN — FUROSEMIDE 20 MG: 10 INJECTION, SOLUTION INTRAMUSCULAR; INTRAVENOUS at 17:22

## 2019-01-01 RX ADMIN — MIDAZOLAM 2 MG: 1 INJECTION INTRAMUSCULAR; INTRAVENOUS at 18:28

## 2019-01-01 RX ADMIN — PROPOFOL 20 MG: 10 INJECTION, EMULSION INTRAVENOUS at 14:25

## 2019-01-01 RX ADMIN — AMIODARONE HYDROCHLORIDE 0.5 MG/MIN: 1.8 INJECTION, SOLUTION INTRAVENOUS at 03:34

## 2019-01-01 RX ADMIN — LORAZEPAM 5 MG/HR: 2 INJECTION INTRAMUSCULAR; INTRAVENOUS at 14:26

## 2019-01-01 RX ADMIN — HEPARIN SODIUM 2200 UNITS: 5000 INJECTION, SOLUTION INTRAVENOUS; SUBCUTANEOUS at 19:24

## 2019-01-01 RX ADMIN — NICOTINE 1 PATCH: 21 PATCH, EXTENDED RELEASE TRANSDERMAL at 08:16

## 2019-01-01 RX ADMIN — Medication 100 MEQ: at 20:04

## 2019-01-01 RX ADMIN — FENTANYL CITRATE 25 MCG: 50 INJECTION, SOLUTION INTRAMUSCULAR; INTRAVENOUS at 14:40

## 2019-01-01 RX ADMIN — MORPHINE SULFATE 2 MG: 2 INJECTION, SOLUTION INTRAMUSCULAR; INTRAVENOUS at 22:48

## 2019-01-01 RX ADMIN — NALOXONE HYDROCHLORIDE 2 MG: 1 INJECTION PARENTERAL at 13:08

## 2019-01-01 RX ADMIN — LORAZEPAM 2 MG: 2 INJECTION INTRAMUSCULAR; INTRAVENOUS at 01:11

## 2019-01-01 RX ADMIN — NOREPINEPHRINE BITARTRATE 0.05 MCG/KG/MIN: 1 INJECTION INTRAVENOUS at 12:30

## 2019-01-01 RX ADMIN — MORPHINE SULFATE 2 MG: 2 INJECTION, SOLUTION INTRAMUSCULAR; INTRAVENOUS at 19:38

## 2019-01-01 RX ADMIN — LORAZEPAM 2 MG: 2 INJECTION INTRAMUSCULAR; INTRAVENOUS at 02:14

## 2019-01-01 RX ADMIN — HYDROXYZINE HYDROCHLORIDE 25 MG: 25 INJECTION, SOLUTION INTRAMUSCULAR at 11:29

## 2019-01-01 RX ADMIN — SODIUM CHLORIDE, PRESERVATIVE FREE 10 ML: 5 INJECTION INTRAVENOUS at 09:57

## 2019-01-01 RX ADMIN — CEFAZOLIN SODIUM 1 G: 1 INJECTION, POWDER, FOR SOLUTION INTRAMUSCULAR; INTRAVENOUS at 11:31

## 2019-01-01 RX ADMIN — SCOPALAMINE 1 PATCH: 1 PATCH, EXTENDED RELEASE TRANSDERMAL at 17:35

## 2019-01-01 RX ADMIN — FENTANYL CITRATE 25 MCG: 50 INJECTION, SOLUTION INTRAMUSCULAR; INTRAVENOUS at 12:30

## 2019-01-01 RX ADMIN — ATORVASTATIN CALCIUM 10 MG: 10 TABLET, FILM COATED ORAL at 20:37

## 2019-01-01 RX ADMIN — ENOXAPARIN SODIUM 70 MG: 80 INJECTION SUBCUTANEOUS at 17:22

## 2019-01-01 RX ADMIN — PHENYLEPHRINE HYDROCHLORIDE 100 MG: 10 INJECTION INTRAVENOUS at 12:31

## 2019-01-01 RX ADMIN — FENTANYL CITRATE 25 MCG: 50 INJECTION INTRAMUSCULAR; INTRAVENOUS at 14:00

## 2019-01-01 RX ADMIN — MORPHINE SULFATE 2 MG: 2 INJECTION, SOLUTION INTRAMUSCULAR; INTRAVENOUS at 17:15

## 2019-01-01 RX ADMIN — MORPHINE SULFATE 2 MG: 2 INJECTION, SOLUTION INTRAMUSCULAR; INTRAVENOUS at 23:58

## 2019-01-01 RX ADMIN — SODIUM CHLORIDE, PRESERVATIVE FREE 10 ML: 5 INJECTION INTRAVENOUS at 21:15

## 2019-01-01 RX ADMIN — MINERAL OIL, PETROLATUM: 425; 573 OINTMENT OPHTHALMIC at 16:25

## 2019-01-01 RX ADMIN — SODIUM CHLORIDE, PRESERVATIVE FREE 10 ML: 5 INJECTION INTRAVENOUS at 20:40

## 2019-01-01 RX ADMIN — EPINEPHRINE 0.5 MCG/KG/MIN: 1 INJECTION INTRAMUSCULAR; INTRAVENOUS; SUBCUTANEOUS at 18:09

## 2019-01-01 RX ADMIN — HEPARIN SODIUM 2200 UNITS: 5000 INJECTION, SOLUTION INTRAVENOUS; SUBCUTANEOUS at 08:40

## 2019-01-01 RX ADMIN — AMIODARONE HYDROCHLORIDE 0.5 MG/MIN: 1.8 INJECTION, SOLUTION INTRAVENOUS at 15:35

## 2019-01-01 RX ADMIN — LORAZEPAM 0.5 MG: 2 INJECTION, SOLUTION INTRAMUSCULAR; INTRAVENOUS at 15:26

## 2019-01-01 RX ADMIN — LORAZEPAM 0.5 MG: 2 INJECTION, SOLUTION INTRAMUSCULAR; INTRAVENOUS at 17:14

## 2019-01-01 RX ADMIN — FENTANYL CITRATE 25 MCG: 50 INJECTION INTRAMUSCULAR; INTRAVENOUS at 23:23

## 2019-01-01 RX ADMIN — MORPHINE SULFATE 2 MG: 2 INJECTION, SOLUTION INTRAMUSCULAR; INTRAVENOUS at 21:15

## 2019-01-01 RX ADMIN — PROPOFOL 20 MCG/KG/MIN: 10 INJECTION, EMULSION INTRAVENOUS at 11:25

## 2019-01-01 RX ADMIN — ZIPRASIDONE MESYLATE 20 MG: 20 INJECTION, POWDER, LYOPHILIZED, FOR SOLUTION INTRAMUSCULAR at 12:36

## 2019-01-01 RX ADMIN — LORAZEPAM 2 MG: 2 INJECTION INTRAMUSCULAR; INTRAVENOUS at 21:15

## 2019-01-01 RX ADMIN — FENTANYL CITRATE 25 MCG: 50 INJECTION INTRAMUSCULAR; INTRAVENOUS at 19:01

## 2019-01-01 RX ADMIN — MIDAZOLAM 2 MG: 1 INJECTION INTRAMUSCULAR; INTRAVENOUS at 16:13

## 2019-01-01 RX ADMIN — MINERAL OIL, PETROLATUM: 425; 573 OINTMENT OPHTHALMIC at 08:07

## 2019-01-01 RX ADMIN — FENTANYL CITRATE 25 MCG: 50 INJECTION INTRAMUSCULAR; INTRAVENOUS at 07:49

## 2019-01-01 RX ADMIN — SODIUM CHLORIDE, PRESERVATIVE FREE 10 ML: 5 INJECTION INTRAVENOUS at 08:16

## 2019-01-01 RX ADMIN — PROPOFOL 20 MG: 10 INJECTION, EMULSION INTRAVENOUS at 12:00

## 2019-01-01 RX ADMIN — LORAZEPAM 2 MG: 2 INJECTION INTRAMUSCULAR; INTRAVENOUS at 17:10

## 2019-01-01 RX ADMIN — PROPOFOL 100 MG: 10 INJECTION, EMULSION INTRAVENOUS at 12:08

## 2019-01-01 RX ADMIN — SODIUM CHLORIDE, PRESERVATIVE FREE 10 ML: 5 INJECTION INTRAVENOUS at 10:07

## 2019-01-01 RX ADMIN — PANTOPRAZOLE SODIUM 40 MG: 40 INJECTION, POWDER, FOR SOLUTION INTRAVENOUS at 05:34

## 2019-01-01 RX ADMIN — MINERAL OIL, PETROLATUM: 425; 573 OINTMENT OPHTHALMIC at 00:30

## 2019-01-01 RX ADMIN — MORPHINE SULFATE 2 MG: 2 INJECTION, SOLUTION INTRAMUSCULAR; INTRAVENOUS at 02:14

## 2019-01-01 RX ADMIN — HEPARIN SODIUM 15 UNITS/KG/HR: 10000 INJECTION, SOLUTION INTRAVENOUS at 17:25

## 2019-01-01 RX ADMIN — AMIODARONE HYDROCHLORIDE 0.5 MG/MIN: 1.8 INJECTION, SOLUTION INTRAVENOUS at 15:27

## 2019-01-01 RX ADMIN — SODIUM CHLORIDE, PRESERVATIVE FREE 10 ML: 5 INJECTION INTRAVENOUS at 20:02

## 2019-11-10 PROBLEM — R77.8 ELEVATED TROPONIN: Status: ACTIVE | Noted: 2019-01-01

## 2019-11-10 NOTE — H&P
[facility] Medicine Admission      Date of Admission: 11/10/2019      Primary Care Physician: Provider, No Known      Chief Complaint: Elevated troponin, new onset CHF    HPI: 54-year-old  male with no medical history presented as a transfer from [place] for elevated troponin, new onset heart failure, worsening shortness of breath.  Patient has been having some chest cold for past 5 to 6 days.  Cold medications did not help.  Worsening shortness of breath with exertion, lying flat over the past few days.  Worse today early in the morning around 3:30 and he went to ER and was transferred here for further evaluation.  Relevant labs/images from the transferring facility include troponin of 0.1, BNP of 16,042 creatinine of 1.3 that is normal, chest x-ray with possible heart failure.    Concurrent Medical History:  has no past medical history on file.  No past medical history    Past Surgical History:  has a past surgical history that includes Incision and Drainage Abscess (Right, 08/31/2019); Joint replacement (Left, 1988); Hernia repair (Bilateral); Cardiac catheterization (N/A, 11/12/2019); Cardiac electrophysiology procedure (N/A, 11/13/2019); and Cardiac catheterization (Right, 11/13/2019).    Family History: family history is not on file.  Unknown    Social History:  reports that he has been smoking cigarettes.  He has a 39.00 pack-year smoking history. He has never used smokeless tobacco. He reports that he uses drugs. Drug: Marijuana. Frequency: 3.00 times per week. He reports that he does not drink alcohol.    Allergies: No Known Allergies    Medications:   Prior to Admission medications    Not on File       Review of Systems:  Review of Systems   Constitutional: Negative for fever.   HENT: Negative for ear pain.    Eyes: Negative for pain.   Respiratory: Positive for shortness of breath. Negative for wheezing.    Cardiovascular: Positive for leg swelling.  Negative for chest pain.   Gastrointestinal: Negative for abdominal pain.   Genitourinary: Negative for dysuria.   Musculoskeletal: Negative for neck pain.   Skin: Negative for rash.   Neurological: Negative for headaches.   Psychiatric/Behavioral: Negative for agitation.      Otherwise complete ROS is negative except as mentioned above.    Physical Exam:   Vitals:  Temperature: 97.6  Heart rate: 87  Respiratory rate: 20  Blood pressure 120/71     Physical Exam   Constitutional: He appears well-developed.   HENT:   Head: Normocephalic and atraumatic.   Eyes: Pupils are equal, round, and reactive to light.   Neck: Normal range of motion.   Cardiovascular: Normal rate.   Pulmonary/Chest: He has decreased breath sounds. He has no wheezes.   Abdominal: Soft. There is no tenderness.   Musculoskeletal: He exhibits edema.   Neurological: He is alert.   Skin: Skin is warm and dry.   Psychiatric: He has a normal mood and affect.         Results Reviewed:  I have personally reviewed current lab, radiology, and data and agree with results.  Lab Results (last 24 hours)     Procedure Component Value Units Date/Time    Respiratory Culture - Sputum, ET Suction [[id number]] Collected:  11/14/19 1315    Specimen:  Sputum from ET Suction Updated:  11/16/19 1144     Respiratory Culture Scant growth (1+) Normal Respiratory Minor     Gram Stain Many (4+) WBCs seen      No epithelial cells seen      Mixed bacterial minor    Blood Gas, Arterial [[id number]]  (Abnormal) Collected:  11/16/19 0830    Specimen:  Arterial Blood Updated:  11/16/19 0848     Site Arterial Line     Juan's Test N/A     pH, Arterial 7.482 pH units      Comment: 83 Value above reference range        pCO2, Arterial 47.8 mm Hg      Comment: 83 Value above reference range        pO2, Arterial 72.5 mm Hg      Comment: 84 Value below reference range        HCO3, Arterial 35.7 mmol/L      Comment: 83 Value above reference range        Base Excess, Arterial 10.6 mmol/L       Comment: 83 Value above reference range        O2 Saturation, Arterial 94.9 %      Barometric Pressure for Blood Gas 756 mmHg      Modality N/A     FIO2 30 %      Ventilator Mode NA     PEEP 5.0     PSV 8.0 cmH2O      Collected by NETTA     Comment: Meter: [id number]     :  [id number]       CBC & Differential [[id number]] Collected:  11/16/19 0545    Specimen:  Blood Updated:  11/16/19 0613    Narrative:       The following orders were created for panel order CBC & Differential.  Procedure                               Abnormality         Status                     ---------                               -----------         ------                     CBC Auto Differential[[id number]]        Abnormal            Final result                 Please view results for these tests on the individual orders.    CBC Auto Differential [[id number]]  (Abnormal) Collected:  11/16/19 0545    Specimen:  Blood Updated:  11/16/19 0613     WBC 11.22 10*3/mm3      RBC 3.88 10*6/mm3      Hemoglobin 12.1 g/dL      Hematocrit 36.9 %      MCV 95.1 fL      MCH 31.2 pg      MCHC 32.8 g/dL      RDW 16.4 %      RDW-SD 57.0 fl      MPV 11.3 fL      Platelets 94 10*3/mm3      Neutrophil % 83.3 %      Lymphocyte % 7.1 %      Monocyte % 8.8 %      Eosinophil % 0.0 %      Basophil % 0.3 %      Immature Grans % 0.5 %      Neutrophils, Absolute 9.34 10*3/mm3      Lymphocytes, Absolute 0.80 10*3/mm3      Monocytes, Absolute 0.99 10*3/mm3      Eosinophils, Absolute 0.00 10*3/mm3      Basophils, Absolute 0.03 10*3/mm3      Immature Grans, Absolute 0.06 10*3/mm3      nRBC 0.0 /100 WBC     Basic Metabolic Panel [[id number]]  (Abnormal) Collected:  11/16/19 0545    Specimen:  Blood Updated:  11/16/19 0612     Glucose 122 mg/dL      BUN 34 mg/dL      Creatinine 1.25 mg/dL      Sodium 143 mmol/L      Potassium 3.4 mmol/L      Chloride 100 mmol/L      CO2 35.0 mmol/L      Calcium 8.4 mg/dL      eGFR Non African Amer 60 mL/min/1.73      BUN/Creatinine  Ratio 27.2     Anion Gap 8.0 mmol/L     Narrative:       GFR Normal >60  Chronic Kidney Disease <60  Kidney Failure <15        Imaging Results (Last 24 Hours)     ** No results found for the last 24 hours. **            Assessment:    Active Hospital Problems    Diagnosis   • **Cardiogenic shock (CMS/HCC)   • Acute systolic heart failure (CMS/HCC)   • Ventricular tachycardia (paroxysmal) (CMS/HCC)   • Elevated troponin   • Chest pain             Plan:  1.  Elevated troponin: New.  Trend troponins.  Therapeutic dose of Lovenox, n.p.o. after midnight, discussed with Dr. Lopez and he will see the patient tomorrow.  2.  new onset heart failure: Echo today.  IV Lasix for now.  Monitor    I discussed the patient's findings and my recommendations with: pt     Signed     Dr Emmanuel Maurice,    11/21/2019  2:36 PM

## 2019-11-11 NOTE — PLAN OF CARE
Problem: Patient Care Overview  Goal: Plan of Care Review  Outcome: Ongoing (interventions implemented as appropriate)   11/11/19 3630   Coping/Psychosocial   Plan of Care Reviewed With patient   Plan of Care Review   Progress no change   OTHER   Outcome Summary pt reports a poor appetite with a 11% weight loss in one month. RD staff added milk to trays TID to provide extra calories and protein. Will continue to monitor.

## 2019-11-11 NOTE — PROGRESS NOTES
"    [facility]  INPATIENT PROGRESS NOTE    Length of Stay: 0  Date of Admission: 11/10/2019  Primary Care Physician: Provider, No Known    Subjective   Chief Complaint: Anxiousness     HPI: Patient reportedly had a run of V. tach overnight.  Metoprolol as well as labs were ordered by the on-call physician.  Metoprolol was not given due to low blood pressure.  The patient tells me that during the episode he did feel some palpitations.  He denied any chest pain, shortness of breath, lightheadedness or dizziness at the time.    Later today after my evaluation of the patient the nurse reported a \"anxiety attack\".  There was associated shortness of breath.  Later the patient told the nurse that he was used to smoking \"reefer\" about 3 times a day for his anxiety.  He also smokes cigarettes and feels that this is contributing.    Review of Systems     All pertinent negatives and positives are as above. All other systems have been reviewed and are negative unless otherwise stated.     Objective    Temp:  [97.5 °F (36.4 °C)-97.8 °F (36.6 °C)] 97.8 °F (36.6 °C)  Heart Rate:  [] 107  Resp:  [20-22] 22  BP: ()/(48-77) 102/77    Physical Exam   Constitutional: He is oriented to person, place, and time. He appears well-developed and well-nourished.   HENT:   Head: Normocephalic and atraumatic.   Eyes: EOM are normal. Pupils are equal, round, and reactive to light.   Neck: Neck supple. No thyromegaly present.   Cardiovascular: Normal rate and regular rhythm.   Pulmonary/Chest: Effort normal and breath sounds normal. He has no wheezes. He has no rales.   Abdominal: Soft. Bowel sounds are normal. There is no tenderness.   Musculoskeletal: He exhibits no edema.   Lymphadenopathy:     He has no cervical adenopathy.   Neurological: He is alert and oriented to person, place, and time. No cranial nerve deficit.   Skin: Skin is warm and dry. No rash noted. "           Results Review:  I have reviewed the labs, radiology results, and diagnostic studies.    Laboratory Data:   Results from last 7 days   Lab Units 11/11/19  0530 11/10/19  1219   SODIUM mmol/L 140 141   POTASSIUM mmol/L 3.7 3.7   CHLORIDE mmol/L 101 102   CO2 mmol/L 24.0 24.0   BUN mg/dL 23* 22*   CREATININE mg/dL 1.06 1.10   GLUCOSE mg/dL 102* 122*   CALCIUM mg/dL 8.9 9.1   ANION GAP mmol/L 15.0 15.0     Estimated Creatinine Clearance: 82.5 mL/min (by C-G formula based on SCr of 1.06 mg/dL).  Results from last 7 days   Lab Units 11/11/19  0530   MAGNESIUM mg/dL 1.6         Results from last 7 days   Lab Units 11/11/19  0530 11/10/19  0740   WBC 10*3/mm3 11.59* 9.4   HEMOGLOBIN g/dL 15.4 15.2   HEMATOCRIT % 44.4 46.1   PLATELETS 10*3/mm3 184 176     Results from last 7 days   Lab Units 11/10/19  0740   INR  1.3       Culture Data:   No results found for: BLOODCX  No results found for: URINECX  No results found for: RESPCX  No results found for: WOUNDCX  No results found for: STOOLCX  No components found for: BODYFLD    Radiology Data:   Imaging Results (Last 24 Hours)     ** No results found for the last 24 hours. **          I have reviewed the patient's current medications.     Assessment/Plan     Active Hospital Problems    Diagnosis   • Elevated troponin       Plan:      1.  Elevated troponin: Dr. Killian consulted.  Echo pending.    2.  Volume overload: Unknown EF.  Echo today.  IV Lasix for now.  Monitor  3.  Ventricular tachycardia -noted on telemetry overnight.  Electrolytes okay.  Unable to give metoprolol due to blood pressure.  Monitor on telemetry.  Dr. Killian consulted.  Echo pending.  4.  Anxiety -patient was benzodiazepine positive on admission.  He is not prescribed benzodiazepines.  If anxiety continues consider initiating SSRI or PRN Vistaril.    5.  Tachycardia dependency.  Nicotine patch as needed.     Wander Lemons MD

## 2019-11-11 NOTE — PLAN OF CARE
Problem: Patient Care Overview  Goal: Individualization and Mutuality  Outcome: Ongoing (interventions implemented as appropriate)    Goal: Discharge Needs Assessment  Outcome: Ongoing (interventions implemented as appropriate)   11/10/19 1147 11/10/19 1151   Disability   Equipment Currently Used at Home none --    Discharge Needs Assessment   Patient/Family Anticipates Transition to --  home with family   Patient/Family Anticipated Services at Transition --  none   Transportation Anticipated --  family or friend will provide       Problem: Pain, Chronic (Adult)  Goal: Identify Related Risk Factors and Signs and Symptoms  Outcome: Ongoing (interventions implemented as appropriate)    Goal: Acceptable Pain/Comfort Level and Functional Ability  Outcome: Ongoing (interventions implemented as appropriate)

## 2019-11-11 NOTE — PLAN OF CARE
Problem: Patient Care Overview  Goal: Plan of Care Review  Outcome: Ongoing (interventions implemented as appropriate)   11/11/19 7479   Coping/Psychosocial   Plan of Care Reviewed With patient   Plan of Care Review   Progress no change   OTHER   Outcome Summary patient has had some anxiety/panic attacks today; ordered nicotine patch to try to help       Problem: Pain, Chronic (Adult)  Goal: Acceptable Pain/Comfort Level and Functional Ability  Outcome: Ongoing (interventions implemented as appropriate)      Problem: Cardiac: ACS (Acute Coronary Syndrome) (Adult)  Goal: Signs and Symptoms of Listed Potential Problems Will be Absent, Minimized or Managed (Cardiac: ACS)  Outcome: Ongoing (interventions implemented as appropriate)      Problem: Cardiac: Heart Failure (Adult)  Goal: Signs and Symptoms of Listed Potential Problems Will be Absent, Minimized or Managed (Cardiac: Heart Failure)  Outcome: Ongoing (interventions implemented as appropriate)      Problem: Mood Impairment (Anxiety Signs/Symptoms) (Adult)  Goal: Improved Mood Symptoms (Anxiety Signs/Symptoms)  Outcome: Ongoing (interventions implemented as appropriate)

## 2019-11-11 NOTE — NURSING NOTE
Notified Dr. Lemons of patient having a panic attack and trouble breathing and that bp was lower than this morning.

## 2019-11-11 NOTE — CONSULTS
"Adult Nutrition  Assessment    Patient Name:  Matthew Danielle  YOB: 1965  MRN: [id number]  Admit Date:  11/10/2019    Assessment Date:  11/11/2019    Comments:  Visited pt today per nurse consult. Pt appeared out of breath and was not very talkative. Pt reported his appetite has not been good lately and reported a weight loss of 10# in approx 1 month (11% weight loss). Pt denied any food allergies, GI distress, chewing/swallowing, and multivitamin use. Pt reported he has been feeling fatigued for the last month with little energy. Nutrition focused physical exam showed signs of muscle wasting in the temples, scapula, and shoulders. RD staff added milk to trays TID to provide extra protein and calories. RD staff will continue to monitor supplement acceptance, PO intake, and exam findings.     Reason for Assessment     Row Name 11/11/19 1446          Reason for Assessment    Reason For Assessment  nurse/nurse practitioner consult;identified at risk by screening criteria  (Pended)      Diagnosis  cardiac disease  (Pended)      Identified At Risk by Screening Criteria  reduced oral intake over the last month;unintentional loss of 10 lbs or more in the past 2 mos  (Pended)          Nutrition/Diet History     Row Name 11/11/19 1448          Nutrition/Diet History    Typical Food/Fluid Intake  pt reports a poor appetite and weight loss. has a lack of energy that has been going on for the last month.   (Pended)      Food Preferences  does not like green beans or carrots  (Pended)      Functional Status  able to prepare meals;able to purchase food;ambulatory  (Pended)      Factors Affecting Nutritional Intake  anorexia  (Pended)          Anthropometrics     Row Name 11/11/19 1451          Anthropometrics    Height  170.2 cm (67.01\")  (Pended)         Ideal Body Weight (IBW)    Ideal Body Weight (IBW) (kg)  68.12         Labs/Tests/Procedures/Meds     Row Name 11/11/19 1450          Labs/Procedures/Meds    " "Lab Results Reviewed  reviewed, pertinent  (Pended)      Lab Results Comments  Glu 102H, BUN:cr 21.7, WBC 11.59H, Troponin 0.1H, BNP 16,042H  (Pended)         Diagnostic Tests/Procedures    Diagnostic Test/Procedure Reviewed  reviewed, pertinent  (Pended)         Medications    Pertinent Medications Reviewed  reviewed, pertinent  (Pended)          Physical Findings     Row Name 11/11/19 1450          Physical Findings    Overall Physical Appearance  shortness of breath;loss of muscle mass  (Pended)      Oral/Mouth Cavity  poor dentition  (Pended)          Estimated/Assessed Needs     Row Name 11/11/19 1451          Calculation Measurements    Weight Used For Calculations  67.3 kg (148 lb 5.9 oz)  (Pended)      Height  170.2 cm (67.01\")  (Pended)         Estimated/Assessed Needs    Additional Documentation  Calorie Requirements (Group);Fluid Requirements (Group);KCAL/KG (Group);Protein Requirements (Group)  (Pended)         Calorie Requirements    Weight Used For Calorie Calculations  67.3 kg (148 lb 5.9 oz)  (Pended)      Estimated Calorie Requirement (kcal/day)  1750  (Pended)      Estimated Calorie Need Method  kcal/kg  (Pended)         KCAL/KG    KCAL/KG  25 Kcal/Kg (kcal);30 Kcal/Kg (kcal)  (Pended)      25 Kcal/Kg (kcal)  1682.5  (Pended)      30 Kcal/Kg (kcal)  2019  (Pended)         Protein Requirements    Weight Used For Protein Calculations  67.3 kg (148 lb 5.9 oz)  (Pended)      Est Protein Requirement Amount (gms/kg)  0.9 gm protein  (Pended)      Estimated Protein Requirements (gms/day)  60.57  (Pended)         Fluid Requirements    Estimated Fluid Requirements (mL/day)  2446  (Pended)      Estimated Fluid Requirement Method  RDA Method  (Pended)      RDA Method (mL)  2446  (Pended)      Shannon-Karina Method (over 20 kg)  2846  (Pended)          Nutrition Prescription Ordered     Row Name 11/11/19 1450          Nutrition Prescription PO    Current PO Diet  Regular  (Pended)      Fluid Consistency  Thin  " (Pended)      Supplement  Milk  (Pended)      Supplement Frequency  3 times a day  (Pended)      Common Modifiers  Cardiac  (Pended)              Malnutrition Severity Assessment     Row Name 11/11/19 5046          Malnutrition Severity Assessment    Malnutrition Type  Acute Disease or Injury - Related Malnutrition  (Pended)         Insufficient Energy Intake     Insufficient Energy Intake   < or equal to 50% of est. energy requirement for > or equal to 5d)  (Pended)  pt reported        Unintentional Weight Loss     Unintentional Weight Loss   Weight loss greater than 5% in one month  (Pended)         Muscle Loss    Loss of Muscle Mass Findings  Moderate  (Pended)      [place] Region  Severe - deep hollowing/scooping, lack of muscle to touch, facial bones well defined  (Pended)      Acromion Bone Region  Moderate - acromion may slightly protrude  (Pended)      Scapular Bone Region  Moderate - mild depression, bones may show slightly  (Pended)         Fluid Accumulation (Edema)    Fluid Acumulation Findings  Mild  (Pended)         Criteria Met (Must meet criteria for severity in at least 2 of these categories: M Wasting, Fat Loss, Fluid, Secondary Signs, Wt. Status, Intake)    Patient meets criteria for   Severe Malnutrition  (Pended)            Electronically signed by:  Mame Mcleod  11/11/19 3:05 PM

## 2019-11-12 PROBLEM — R07.9 CHEST PAIN: Status: ACTIVE | Noted: 2019-01-01

## 2019-11-12 NOTE — SIGNIFICANT NOTE
Dr. Sanchez came into room to discuss a procedure tomorrow with client but was unable to talk directly with patient and spoke with significant other in room. MD Laura asked about clients current condition and RN informed MD Laura about clients situation since arrival to 3W from cath lab this morning. Informed that Hospitalist was aware of situation, but paged again in relation to clients condition beginning to worsen.   Client still having anxiety complications, cussing nurse and demanding to get up out of bed prior to bedrest being completed. RN paged hospitalist to update physician on patient status and informed hospitalist that RN has been in patients room since arrival from cath lab and patient could not be unsupervised as he has attempted to get out of bed and would not follow the cardiologist orders to remain on bedrest post heart cath, despite vistaril administrations and Geodon administration.    Patient began having apneic episodes and began going in and out of consciousness, but when awake client oxygen saturation was above 90% on room air. Hospitalist made aware of patients condition and RN requested physician to come to floor to evaluate patient. Hospitalist stated she was unable to come at this time.

## 2019-11-12 NOTE — CONSULTS
Cardiology at [facility]  Cardiovascular Consultation Note      Matthew Danielle  318/1  [id number]  1965    DATE OF ADMISSION: 11/10/2019  DATE OF CONSULTATION:  11/12/2019    Provider, No Known  Treatment Team:   Attending Provider: Emmanuel Maurice DO  Consulting Physician: Emmanuel Maurice DO  Consulting Physician: Cheikh Lopez MD  Surgeon: Cheikh Lopez MD  Consulting Physician: Elysia Sanchez MD  Admitting Provider: Emmanuel Maurice DO    No chief complaint on file.      Chief complaint/ Reason for Consultation; multiple runs of ventricular tachycardia longest and 45 beat with a severe LV dysfunction nonischemic cardiomyopathy intraventricular conduction delay with QRS duration greater than 150 ms and a syncopal episode prior to hospitalization      History of Present Illness  Body mass index is 25.85 kg/m² (pended). BMI is above normal parameters. Recommendations include: exercise counseling, nutrition counseling and referral to primary care.    Patient is a current tobacco user. I have educated the patient on the risks of continued tobacco use. Tobacco cessation education was given <3 min. Patient does not desire tobacco cessation at this time.  Risk of continued smoking explained to the patient    54 years old patient evaluated by Dr. Lopez with symptom of congestive heart failure exertional dyspnea functional class II-III, chronic history of smoking, intraventricular conduction delay with QRS duration greater than 150 ms, history of syncope prior to hospitalization and chest discomfort with echocardiographic study revealed severe LV dysfunction ejection fraction less than 20% with mild to moderate tricuspid and moderate to severe mitral regurgitation who underwent cardiac catheterization reported chronically occluded RCA with a severe LV dysfunction may be combination of ischemic canal and nonischemic cardiomyopathy with history of anxiety and monitor multiple runs  of ventricular tachycardia longest of 45 beat.  I was requested to evaluate this patient for biventricular ICD implantation given the intraventricular conduction delay and severe LV dysfunction and documented ventricular tachycardia with recent syncope patient hemodynamically stable at the time of evaluation and his spouse was present the room.  He is a pain-free and clinically not in heart failure at the time of evaluations his BNP was significantly elevated    BNP  <5 - 125 pg/mL 16,042 Abnormally high         Echo 11/11/2019  · The left ventricular cavity is mild-to-moderately dilated.  · Left atrial cavity size is mildly dilated.  · Moderate-to-severe mitral valve regurgitation is present  · Mild to moderate tricuspid valve regurgitation is present.  · Mild aortic valve regurgitation is present.  · The following left ventricular wall segments are hypokinetic: mid anterior, apical anterior, basal anterolateral, mid anterolateral, apical lateral, basal inferolateral, mid inferolateral, apical inferior, mid inferior, apical septal, basal inferoseptal, mid inferoseptal, apex hypokinetic, mid anteroseptal, basal anterior, basal inferior and basal inferoseptal.  Calculated EF = 22%. Estimated EF appears to be in the range of less than 20%. Normal left ventricular wall thickness noted. There is left ventricular global hypokinesis noted    Cardiac cath 11/12/2019 impression:    1.  100% occlusion of the right coronary artery with the distal right coronary artery filling in from right to right collateral.  2.  Nonobstructive disease in the circumflex artery in the proximal portion of 20 to 30% before the origin of the obtuse marginal branch.  3.  The first obtuse marginal branch which was bifurcating in the proximal portion with 40% stenosis.  4.  Proximal left anterior descending artery with 20 to 30% stenosis  5.  Elevated left ventricular end-diastolic pressure.     Recommendations: Patient was recommended medical  management for the coronary artery disease and patient was recommended LifeVest and aggressive pharmacological therapy for severe left ventricular systolic dysfunction.    Have discussed with the patient the possible need for an AICD.    Past Medical History:  1.  Chest pain shortness of breath.  2.  Positive troponin.  3.  Left ventricular systolic dysfunction with an ejection fraction of less than 20%.  4.  Moderate to severe mitral regurgitation.  5.  Mild to moderate tricuspid regurgitation.  6.  Mild aortic insufficiency.  7.  Chronic obstructive lung disease with tobacco abuse.  8.  Right thumb abscess with incision and drainage.     Past Surgical History:  1.  Right thumb incision and drainage.  2.  Total knee arthroplasty.  3.  Bilateral inguinal hernia repair.        Family History: Family history significant for mother who has nonischemic cardiomyopathy with an AICD placement        Social History: Smokes up to 1 pack/day denies any alcohol intake patient is a  and works in construction.        Cardiac Risk Factors:   1.  Male.  2.  Tobacco abuse.    History reviewed. No pertinent past medical history.    Past Surgical History:   Procedure Laterality Date   • HERNIA REPAIR Bilateral     inguinal   • INCISION AND DRAINAGE ABSCESS Right 08/31/2019    right thumb joint   • JOINT REPLACEMENT Left 1988    knee       No Known Allergies    No current facility-administered medications on file prior to encounter.      No current outpatient medications on file prior to encounter.       Social History     Socioeconomic History   • Marital status: Single     Spouse name: Not on file   • Number of children: Not on file   • Years of education: Not on file   • Highest education level: Not on file   Tobacco Use   • Smoking status: Current Every Day Smoker     Packs/day: 1.00     Years: 39.00     Pack years: 39.00     Types: Cigarettes   • Smokeless tobacco: Never Used   Substance and Sexual Activity   • Alcohol  use: No     Frequency: Never   • Drug use: Yes     Frequency: 3.0 times per week     Types: Marijuana   • Sexual activity: Yes     Partners: Female     Birth control/protection: None           REVIEW OF SYSTEMS:   ROS  Constitutional:  Denies recent weight loss, weight gain, fever or chills, no change in exercise tolerance.     HENT:  Denies any hearing loss, epistaxis, hoarseness, or difficulty speaking.      Eyes: Wears eyeglasses or contact lenses      Respiratory:  Denies dyspnea with exertion,no cough, wheezing, or hemoptysis.      Cardiovascular: Positive for chest pain and shortness of breath.  Negative for palpitations,  orthopnea, PND, peripheral edema, syncope, or claudication.      Gastrointestinal:  Denies change in bowel habits, dyspepsia, ulcer disease, hematochezia, or melena.  No nausea, no vomiting, no hematemesis, no diarrhea or constipation.     Endocrine: Negative for cold intolerance, heat intolerance, polydipsia, polyphagia or polyuria.   Genitourinary: Negative for hematuria.  No frequent urination or nocturia.      Musculoskeletal: Denies any history of arthritic symptoms or back problems . .     Skin:  Denies any change in hair or nails, rashes, or skin lesions.      Allergic/Immunologic: Negative.  Negative for environmental allergies, food allergies or immunocompromised state.      Neurological:  Denies any history of recurrent headaches, strokes, TIA, or seizure disorder.      Hematological: Denies excessive bleeding, easy bruising, fatigue, lymphadenopathy or petechiae or any bleeding disorders.      Psychiatric/Behavioral: Denies any history of depression, substance abuse, or change in cognitive function     Objective:     Vitals:    11/12/19 1045 11/12/19 1120 11/12/19 1213 11/12/19 1300   BP: 102/56  94/52 107/52   BP Location: Right arm   Left arm   Patient Position: Lying   Lying   Pulse: 102 110 112 95   Resp: 26 18     Temp:       TempSrc:       SpO2: (!) 89% 100%  92%   Weight:    "    Height:         Body mass index is 25.85 kg/m² (pended).  Flowsheet Rows      First Filed Value   Admission Height  170.2 cm (67\") Documented at 11/10/2019 1146   Admission Weight  74.6 kg (164 lb 6.4 oz) Documented at 11/10/2019 1146          Intake/Output Summary (Last 24 hours) at 11/12/2019 1355  Last data filed at 11/12/2019 1214  Gross per 24 hour   Intake 2000 ml   Output 1250 ml   Net 750 ml         Physical Exam   Physical Exam  General Appearance:    Alert, oriented, cooperative, in no acute distress.   Head:    Normocephalic, atraumatic, without obvious abnormality.   Eyes:           IMMANUEL.  Lids and lashes normal, conjunctivae and sclerae normal, no icterus, no pallor.   Ears:    Ears appear intact with no abnormalities noted.   Throat:   Mucous membranes pink and moist.   Neck:   Supple, trachea midline, no carotid bruit, no organomegaly or JVD.   Lungs:     Clear to auscultation and percussion, respirations regular, even and unlabored. No wheezes, rales or rhonchi.    Heart:    Regular rhythm and normal rate, normal S1 and S2, no            murmur, no gallop, no rub, no click.   Abdomen:     Soft, non-tender, non-distended, no guarding, no rebound tenderness, normal bowel sounds in all four quadrants, no masses, liver and spleen nonpalpable.   Genitalia:    Deferred.   Extremities:   Moves all extremities well, no edema, no cyanosis, no              redness, no clubbing.   Pulses:   Pulses palpable and equal bilaterally.   Skin:   Moist and warm. No bleeding, bruising or rash.   Neurologic/Psychiatric:   Alert and oriented to person, place, and time.  Motor, power and tone in upper and lower extremities are grossly intact. No focal neurological deficits. Normal cognitive function. N       Radiology Review    No orders to display       Lab Results:  Lab Results (last 24 hours)     Procedure Component Value Units Date/Time    Basic Metabolic Panel [[id number]]  (Abnormal) Collected:  11/12/19 0559    " Specimen:  Blood Updated:  11/12/19 0630     Glucose 109 mg/dL      BUN 27 mg/dL      Creatinine 1.15 mg/dL      Sodium 140 mmol/L      Potassium 3.7 mmol/L      Chloride 99 mmol/L      CO2 25.0 mmol/L      Calcium 9.1 mg/dL      eGFR Non African Amer 66 mL/min/1.73      BUN/Creatinine Ratio 23.5     Anion Gap 16.0 mmol/L     Narrative:       GFR Normal >60  Chronic Kidney Disease <60  Kidney Failure <15    Protime-INR [[id number]]  (Abnormal) Collected:  11/12/19 0559    Specimen:  Blood Updated:  11/12/19 0627     Protime 15.8 Seconds      INR 1.28    Narrative:       Therapeutic range for most indications is 2.0-3.0 INR,  or 2.5-3.5 for mechanical heart valves.    CBC & Differential [[id number]] Collected:  11/12/19 0559    Specimen:  Blood Updated:  11/12/19 0611    Narrative:       The following orders were created for panel order CBC & Differential.  Procedure                               Abnormality         Status                     ---------                               -----------         ------                     CBC Auto Differential[[id number]]        Abnormal            Final result                 Please view results for these tests on the individual orders.    CBC Auto Differential [[id number]]  (Abnormal) Collected:  11/12/19 0559    Specimen:  Blood Updated:  11/12/19 0611     WBC 11.61 10*3/mm3      RBC 5.14 10*6/mm3      Hemoglobin 15.8 g/dL      Hematocrit 45.7 %      MCV 88.9 fL      MCH 30.7 pg      MCHC 34.6 g/dL      RDW 14.4 %      RDW-SD 46.2 fl      MPV 10.8 fL      Platelets 188 10*3/mm3      Neutrophil % 72.1 %      Lymphocyte % 18.2 %      Monocyte % 7.5 %      Eosinophil % 0.9 %      Basophil % 0.9 %      Immature Grans % 0.4 %      Neutrophils, Absolute 8.38 10*3/mm3      Lymphocytes, Absolute 2.11 10*3/mm3      Monocytes, Absolute 0.87 10*3/mm3      Eosinophils, Absolute 0.10 10*3/mm3      Basophils, Absolute 0.10 10*3/mm3      Immature Grans, Absolute 0.05 10*3/mm3      nRBC 0.0  /100 WBC           I personally viewed and interpreted the patient's EKG/Telemetry data       Assessment/Plan:       #1 runs of ventricular tachycardia longest was 45 beats  #2 history of syncope may be arrhythmic in etiology  3 cardiomyopathy combined ischemic and nonischemic with chronically occluded RCA  #4 congestive heart failure due to reduced left trending systolic function structural stage C functional class II-III  #4 intraventricular conduction delay with a QRS duration greater than 150 ms  54 years old patient admitted for evaluation chest pain sinus symptoms consistent with congestive heart failure, syncope, intraventricular conduction delay with QRS duration greater than 150 ms with a severe LV dysfunction and chronically occluded RCA with a documented runs of ventricular tachycardia longest of 45 beat.  I do agree with Dr. Lopez regarding biventricular ICD implantation given the severe LV dysfunction congestive heart failure documented ventricular tachycardia and history of syncope.  Patient started on low-dose beta-blocker will add low-dose Aldactone and ARB and continue aspirin.  Recommend to start the patient on statin such as Lipitor starting a dose of 10 mg can be titrated up.  Procedure risk regarding the biventricular ST implantation discussed with the patient and the family.  Risks included but not limited to infection, bleeding, stroke, pneumothorax, lead dislodgment, cardiac perforation.  Risk arranged for less than 1 to 5%.  Understand willing to proceed forward.  Risk of continued smoking explained to the patient's.      Thank you for allowing me to participate in the care of Matthew Danielle. Feel free to contact me directly with any further questions or concerns.    Elysia Sanchez MD  11/12/19  1:55 PM.      Part of this note may be an electronic transcription/translation of spoken language to printed text using the Dragon Dictation system.

## 2019-11-12 NOTE — SIGNIFICANT NOTE
Narcan delivered to room by JAY Lomas. Medication administered, client had no reaction to Narcan administration. Client still having apneic episodes and going in and out of consciousness. RN had to perform sternal rubs each time to remind client to breathe and to wake up during apneic episodes. Hospitalist arrived to room.

## 2019-11-12 NOTE — SIGNIFICANT NOTE
RN called clinical leader JAY Contreras and asked for assistance to help manage patient. Clinical Leader came into room and witnessed apneic episode. Clinical Leader paged hospitalist again and requested her to come evaluate patients condition. Hospitalist notified, and en route. TORB to administer Narcan 2mg IV once. Waiting for verification from pharmacy and medication to arrive to administer.

## 2019-11-12 NOTE — PROGRESS NOTES
".jrb      [facility] Medicine Services  INPATIENT PROGRESS NOTE    Length of Stay: 0  Date of Admission: 11/10/2019  Primary Care Physician: Provider, No Known    Subjective   Chief Complaint: Anxiousness     HPI:S/P [facility]  Procedure patient has been restless.  Nurse has had trouble getting him to lie still for post-cath protocol.  He was given Versed and fentanyl during the procedure.  Due to restlessness, he was given Vistaril after returning to the floor.  There was little to no improvement so Geodon was given.  He then began to have apneic spells when falling asleep.  Narcan was ordered x1.  The patient awakened angry and belligerent.  Once the Narcan wore off, he did fall back asleep and provider witnessed apneic spells.  His family at bedside states he often stops breathing during sleep and startles awake at home.  They deny any illicit drug use other than THC.  The patient is in and out of sleep and does open his eyes and tell us that he uses \"crack cocaine every day\".       Review of Systems     Patient will not participate in review of systems questioning between his belligerent behavior and sleepiness.    Objective    Temp:  [96.4 °F (35.8 °C)-97.9 °F (36.6 °C)] 96.4 °F (35.8 °C)  Heart Rate:  [] 100  Resp:  [18-26] 18  BP: ()/(52-77) 100/61    Physical Exam   Constitutional: He appears well-developed and well-nourished.   Intermittently confused.  Belligerent and sleepy.   HENT:   Head: Normocephalic and atraumatic.   Mouth/Throat: Oropharynx is clear and moist.   Eyes: EOM are normal. Pupils are equal, round, and reactive to light.   Neck: Neck supple. No thyromegaly present.   Cardiovascular: Regular rhythm.   Murmur heard.  Tachycardic   Pulmonary/Chest: Effort normal and breath sounds normal. He has no wheezes. He has no rales.   Abdominal: Soft. Bowel sounds are normal. There is no tenderness.   Musculoskeletal: He exhibits no edema or tenderness. "   Lymphadenopathy:     He has no cervical adenopathy.   Neurological: No cranial nerve deficit.   Disoriented, restless.  Oriented to self only.   Skin: Skin is warm and dry. No rash noted.           Results Review:  I have reviewed the labs, radiology results, and diagnostic studies.    Laboratory Data:   Results from last 7 days   Lab Units 11/12/19  0559 11/11/19  0530 11/10/19  1219   SODIUM mmol/L 140 140 141   POTASSIUM mmol/L 3.7 3.7 3.7   CHLORIDE mmol/L 99 101 102   CO2 mmol/L 25.0 24.0 24.0   BUN mg/dL 27* 23* 22*   CREATININE mg/dL 1.15 1.06 1.10   GLUCOSE mg/dL 109* 102* 122*   CALCIUM mg/dL 9.1 8.9 9.1   ANION GAP mmol/L 16.0* 15.0 15.0     Estimated Creatinine Clearance: 77.8 mL/min (by C-G formula based on SCr of 1.15 mg/dL).  Results from last 7 days   Lab Units 11/11/19  0530   MAGNESIUM mg/dL 1.6         Results from last 7 days   Lab Units 11/12/19  0559 11/11/19  0530 11/10/19  0740   WBC 10*3/mm3 11.61* 11.59* 9.4   HEMOGLOBIN g/dL 15.8 15.4 15.2   HEMATOCRIT % 45.7 44.4 46.1   PLATELETS 10*3/mm3 188 184 176     Results from last 7 days   Lab Units 11/12/19  0559 11/10/19  0740   INR  1.28* 1.3       Culture Data:   No results found for: BLOODCX  No results found for: URINECX  No results found for: RESPCX  No results found for: WOUNDCX  No results found for: STOOLCX  No components found for: BODYFLD    Radiology Data:   Imaging Results (Last 24 Hours)     ** No results found for the last 24 hours. **          I have reviewed the patient's current medications.     Assessment/Plan     Active Hospital Problems    Diagnosis   • **Chest pain     Added automatically from request for surgery [id number]     • Elevated troponin       Plan:      1.  Elevated troponin: Dr. Killian consulted.  Echo pending.    2.  Acute HFrEF:  EF 20%.  Lasix.  [facility] today, results Pending.    3.  Ventricular tachycardia -noted on telemetry overnight.  Electrolytes okay.  Unable to give metoprolol due to blood pressure.  Monitor  on telemetry.  Dr. Lopez consulted.  Echo c EF 20%  4.  Anxiety -patient was benzodiazepine positive on admission.  He is not prescribed benzodiazepines.  Prn vistrail.  Geodon x 1 post procedure for restlessness not relieved by vistrail.  5.  Intermittent apnea - post procedure.  Suspect over sedation.  Angry and belligerent after narcan x 1.  Will transfer to step down and place on continuous bipap.  Will need eval for sleep apnea as outpatient given that his family says he often stops breathing in his sleep and startles awake.       6.  Acute encephalopathy -[place] metabolic.  Likely related to the sedating medications that he has been given pre-and post heart cath.  And further sedated medications until patient is able to wake up and breathe on his own.  Continuous BiPAP for now.  6.  Tobacco dependency.  Nicotine patch as needed.     Wander Lemons MD

## 2019-11-12 NOTE — CONSULTS
Cardiology Consultation Note.        Patient Name: Matthew Danielle  Age/Sex: 54 y.o. male  : 1965  MRN: [id number]    Date of consultation: 2019  Consulting Physician: Cheikh Lopez MD  Primary care Physician: Provider, No Known  Requesting Physician:   Emmanuel Maurice DO     Reason for consultation: Shortness of breath    Subjective:       Chief Complaint: Shortness of breath syncope    History of Present Illness:  Matthew Danielle is a 54 y.o. male     Body mass index is 25.27 kg/m² (pended).  With past medical history significant for chest pain with previous nuclear Cardiolite stress test which was negative, history of tobacco abuse, and family history of nonischemic cardiomyopathy with mother who has a defibrillator who presented to the [facility] emergency room with increasing shortness of breath over a 1 month duration.  Patient a month ago had a syncopal episode.  Patient denies any associated symptoms of palpitation preceding the syncopal episode.  Patient on questioning does complain of having a few episodes of substernal chest pain.    Patient initial evaluation at [facility] emergency room had revealed cardiomegaly with congestive heart failure and was subsequently transferred to [facility].  Patient on initial evaluation had positive troponin.  Patient underwent transthoracic echocardiogram which revealed severe left ventricular systolic dysfunction with an ejection fraction of less than 20% with mild to moderate tricuspid regurgitation and moderate to severe mitral regurgitation with left ventricular enlargement and mild aortic insufficiency.    Patient prior to coming into the hospital was not on any pharmacological therapy.  Patient had no previous history of documented arterial hypertension.  Patient has complained of having syncopal episodes at home.  Patient on further questioning denies any fever chill productive cough.    Patient 6 weeks ago had the left  thumb injury with subsequent abscess which was drained at [facility] and subsequently was on antibiotics.  Patient on questioning has not had any febrile episode.    Patient does admit on eating a lot of salt.  Patient does have episodes of blood in the stool and has had episodes of hemoptysis.  Patient smokes up to half pack per day.    Patient 10 point review of system except for what stated in the history of present illness and negative    Past Medical History:  1.  Chest pain shortness of breath.  2.  Positive troponin.  3.  Left ventricular systolic dysfunction with an ejection fraction of less than 20%.  4.  Moderate to severe mitral regurgitation.  5.  Mild to moderate tricuspid regurgitation.  6.  Mild aortic insufficiency.  7.  Chronic obstructive lung disease with tobacco abuse.  8.  Right thumb abscess with incision and drainage.    Past Surgical History:  1.  Right thumb incision and drainage.  2.  Total knee arthroplasty.  3.  Bilateral inguinal hernia repair.      Family History: Family history significant for mother who has nonischemic cardiomyopathy with an AICD placement      Social History: Smokes up to 1 pack/day denies any alcohol intake patient is a  and works in construction.       Cardiac Risk Factors:   1.  Male.  2.  Tobacco abuse.    Allergies:  No Known Allergies    Medication:  No medications prior to admission.           Review of Systems:       Constitutional:  Denies recent weight loss, weight gain, fever or chills, no change in exercise tolerance.     HENT:  Denies any hearing loss, epistaxis, hoarseness, or difficulty speaking.     Eyes: Wears eyeglasses or contact lenses     Respiratory:  Denies dyspnea with exertion,no cough, wheezing, or hemoptysis.     Cardiovascular: Positive for chest pain and shortness of breath.  Negative for palpitations,  orthopnea, PND, peripheral edema, syncope, or claudication.     Gastrointestinal:  Denies change in bowel habits,  dyspepsia, ulcer disease, hematochezia, or melena.  No nausea, no vomiting, no hematemesis, no diarrhea or constipation.    Endocrine: Negative for cold intolerance, heat intolerance, polydipsia, polyphagia or polyuria. Denies any history of weight change or unintended weight loss.    Genitourinary: Negative for hematuria.  No frequent urination or nocturia.      Musculoskeletal: Denies any history of arthritic symptoms or back problems .  No joint pain, joint stiffness, joint swelling, muscle pain, muscle weakness or neck pain.    Skin:  Denies any change in hair or nails, rashes, or skin lesions.     Allergic/Immunologic: Negative.  Negative for environmental allergies, food allergies or immunocompromised state.     Neurological:  Denies any history of recurrent headaches, strokes, TIA, or seizure disorder.     Hematological: Denies excessive bleeding, easy bruising, fatigue, lymphadenopathy or petechiae or any bleeding disorders.     Psychiatric/Behavioral: Denies any history of depression, substance abuse, or change in cognitive function. Denies any psychomotor reaction or tangential thought.  No depression, homicidal ideations or suicidal ideations.          Objective:     Objective:  Temp:  [97.5 °F (36.4 °C)-97.8 °F (36.6 °C)] 97.8 °F (36.6 °C)  Heart Rate:  [] 99  Resp:  [20-22] 22  BP: ()/(48-77) 102/77      Body mass index is 25.27 kg/m² (pended).           Physical Exam:   General Appearance:    Alert, oriented, cooperative, in no acute distress.   Head:    Normocephalic, atraumatic, without obvious abnormality.   Eyes:           IMMANUEL.  Lids and lashes normal, conjunctivae and sclerae normal, no icterus, no pallor.   Ears:    Ears appear intact with no abnormalities noted.   Throat:   Mucous membranes pink and moist.   Neck:   Supple, trachea midline, no carotid bruit, no organomegaly or JVD.   Lungs:     Clear to auscultation and percussion, respirations regular, even and unlabored. No  wheezes, rales or rhonchi.    Heart:    Regular rhythm and normal rate, normal S1 and S2, no            murmur, no gallop, no rub, no click.   Abdomen:     Soft, non-tender, non-distended, no guarding, no rebound tenderness, normal bowel sounds in all four quadrants, no masses, liver and spleen nonpalpable.   Genitalia:    Deferred.   Extremities:   Moves all extremities well, no edema, no cyanosis, no              redness, no clubbing.   Pulses:   Pulses palpable and equal bilaterally.   Skin:   Moist and warm. No bleeding, bruising or rash.   Neurologic/Psychiatric:   Alert and oriented to person, place, and time.  Motor, power and tone in upper and lower extremities are grossly intact. No focal neurological deficits. Normal cognitive function. No psychomotor reaction or tangential thought. No depression, homicidal ideations and suicidal ideations.       Medication Review:   Current Facility-Administered Medications   Medication Dose Route Frequency Provider Last Rate Last Dose   • enoxaparin (LOVENOX) syringe 70 mg  1 mg/kg Subcutaneous Q12H Emmanuel Maurice DO   70 mg at 11/10/19 1722   • furosemide (LASIX) injection 20 mg  20 mg Intravenous Q12H Emmanuel Maurice DO   20 mg at 11/11/19 1810   • hydrOXYzine (VISTARIL) injection 25 mg  25 mg Intramuscular Q6H PRN Wander Lemons MD       • Influenza Vac Subunit Quad (FLUCELVAX) injection 0.5 mL  0.5 mL Intramuscular During Hospitalization Emmanuel Maurice DO       • metoprolol tartrate (LOPRESSOR) half tablet 12.5 mg  12.5 mg Oral Q12H Pretty Jules MD       • nicotine (NICODERM CQ) 21 MG/24HR patch 1 patch  1 patch Transdermal Q24H Wander Lemons MD   1 patch at 11/11/19 1555   • ondansetron (ZOFRAN) injection 4 mg  4 mg Intravenous Q6H PRN Emmanuel Maurice DO       • sodium chloride 0.9 % flush 10 mL  10 mL Intravenous Q12H Emmanuel Maurice DO   10 mL at 11/11/19 0857   • sodium chloride 0.9 % flush 10 mL  10 mL Intravenous PRN  Emmanuel Maurice,            Lab Review:     Results from last 7 days   Lab Units 11/11/19  0530   SODIUM mmol/L 140   POTASSIUM mmol/L 3.7   CHLORIDE mmol/L 101   CO2 mmol/L 24.0   BUN mg/dL 23*   CREATININE mg/dL 1.06   CALCIUM mg/dL 8.9   GLUCOSE mg/dL 102*     Results from last 7 days   Lab Units 11/10/19  1820 11/10/19  1439 11/10/19  1219 11/10/19  0740   TROPONIN I ng/mL  --   --   --  0.1*   TROPONIN T ng/mL 0.021 0.013 0.016  --      Results from last 7 days   Lab Units 11/10/19  0740   BNP pg/mL 16,042*     Results from last 7 days   Lab Units 11/11/19  0530   WBC 10*3/mm3 11.59*   HEMOGLOBIN g/dL 15.4   HEMATOCRIT % 44.4   PLATELETS 10*3/mm3 184     Results from last 7 days   Lab Units 11/10/19  0740   INR  1.3   APTT SECONDS 25     Results from last 7 days   Lab Units 11/11/19  0530   MAGNESIUM mg/dL 1.6                   EKG:   ECG/EMG Results (last 24 hours)     Procedure Component Value Units Date/Time    Adult Transthoracic Echo Complete W/ Cont if Necessary Per Protocol [[id number]] Collected:  11/11/19 0909     Updated:  11/11/19 1316     BSA 1.9 m^2       CV ECHO RENÉ - RVDD 3.4 cm      IVSd 1.5 cm      LVIDd 8.5 cm      LVIDs 7.2 cm      LVPWd 1.1 cm      IVS/LVPW 1.4     FS 15.5 %      EDV(Teich) 391.3 ml      ESV(Teich) 268.8 ml      EF(Teich) 31.3 %      EDV(cubed) 607.6 ml      ESV(cubed) 367.1 ml      EF(cubed) 39.6 %      LV mass(C)d 605.5 grams      LV mass(C)dI 325.8 grams/m^2      SV(Teich) 122.5 ml      SI(Teich) 65.9 ml/m^2      SV(cubed) 240.6 ml      SI(cubed) 129.4 ml/m^2      MV Diam 3.5 cm      Ao root diam 3.2 cm      Ao root area 8.0 cm^2      ACS 1.7 cm      LA dimension 4.2 cm      asc Aorta Diam 3.1 cm      Ao Isth Diam 2.6 cm      LA/Ao 1.3     LVOT diam 2.0 cm      LVOT area 3.1 cm^2      LVOT area(traced) 3.1 cm^2      Ao root area (BSA corrected) 1.7     MV E max willie 128.0 cm/sec      MV A max willie 73.7 cm/sec      MV E/A 1.7     MV V2 max 136.0 cm/sec      MV max PG  7.4 mmHg      MV V2 mean 83.7 cm/sec      MV mean PG 3.0 mmHg      MV V2 VTI 25.6 cm      MV area (1 diam) 9.6 cm^2      MVA(VTI) 1.1 cm^2      MV Flow area(1diam) 9.6 cm^2      MV P1/2t max willie 137.0 cm/sec      MV P1/2t 33.5 msec      MVA(P1/2t) 6.6 cm^2      MV dec slope 1,198 cm/sec^2      Ao pk willie 115.0 cm/sec      Ao max PG 5.3 mmHg      Ao max PG (full) 3.6 mmHg      Ao V2 mean 85.2 cm/sec      Ao mean PG 3.0 mmHg      Ao mean PG (full) 2.0 mmHg      Ao V2 VTI 11.3 cm      DULCE(I,A) 2.4 cm^2      DULCE(I,D) 2.4 cm^2      DULCE(V,A) 1.8 cm^2      DULCE(V,D) 1.8 cm^2      AI max willie 364.0 cm/sec      AI max PG 53.0 mmHg      AI dec slope 323.0 cm/sec^2      AI P1/2t 330.1 msec      LV V1 max PG 1.7 mmHg      LV V1 mean PG 1.0 mmHg      LV V1 max 64.6 cm/sec      LV V1 mean 48.4 cm/sec      LV V1 VTI 8.7 cm      MR max willie 406.0 cm/sec      MR max PG 65.9 mmHg      SV(MV 1 diam) 246.3 ml      SI(MV 1 diam) 132.5 ml/m^2      SV(Ao) 90.9 ml      SI(Ao) 48.9 ml/m^2      SV(LVOT) 27.2 ml      SI(LVOT) 14.6 ml/m^2      PA V2 max 87.7 cm/sec      PA max PG 3.1 mmHg      PI end-d willie 108.0 cm/sec      TR max willie 341.0 cm/sec       CV ECHO RENÉ - RF(MV,AO)(1 DIAM) 0.63     RF(MV,LVOT)(1diam) 0.89     MVA P1/2T LCG 1.6 cm^2       CV ECHO RENÉ - BZI_BMI 25.7 kilograms/m^2       CV ECHO RENÉ - BSA(HAYCOCK) 1.9 m^2       CV ECHO RENÉ - BZI_METRIC_WEIGHT 74.4 kg       CV ECHO RENÉ - BZI_METRIC_HEIGHT 170.2 cm      EF(MOD-bp) 22 %      RAP systole 12 mmHg      RVSP(TR) 54 mmHg      Target HR (85%) 141 bpm      Max. Pred. HR (100%) 166 bpm           ECHO:        Imaging:  Imaging Results (Last 24 Hours)     ** No results found for the last 24 hours. **          I personally viewed and interpreted the patient's EKG/Telemetry data.    Assessment:   1.  Shortness of breath.  2.  Left ventricular systolic dysfunction with an ejection fraction of less than 20%.  3.  Valvular insufficiency.  4.  Tobacco abuse.          Plan:    1.  Shortness of breath.  Patient does have symptoms of shortness of breath with new onset of congestive heart failure with indeterminate troponin with atypical symptoms of chest pain.  As the patient transthoracic echocardiogram revealed left ventricular systolic dysfunction with an ejection fraction of less than 20% patient at the present time has been recommended coronary angiogram to rule out underlying coronary artery disease as the etiology for the left ventricular systolic dysfunction.  Patient has been recommended coronary angiogram.  Patient has no documented history of atherosclerotic coronary artery disease with symptoms of substernal chest pain suggestive of crescendo unstable angina pectoris.  Patient has been explained risk-benefit treatment option for a coronary angiogram and an informed consent was obtained from the patient for the coronary angiogram.  Patient Mallampati score was II patient ASA classification was II.  Patient would undergo IV hydration and will check the renal function prior to the coronary angiogram.    2.  Syncopal episode.  Patient would be followed on telemetry monitor.  Patient had not reveal of any evidence of any arrhythmia.  Patient would be supplemented with magnesium.  Patient would require a LifeVest on discharge.    3.  Left ventricular systolic dysfunction.  Patient does have episodes of hypotension.  Patient would be continued on the low-dose of carvedilol and gentle diuresis.  Patient would be evaluated by heart failure clinic.    4.  Risk factor modification.  Patient has been counseled to quit smoking.    The above plan of management were discussed with the patient.            Time: time spent in face-to-face evaluation of greater than 55  minutes and interacting and formulating examining and discussing the plan with the patient with 50% of greater time spent in face-to-face interaction.    Cheikh Lopez MD  11/11/19  7:21 PM    Dictated utilizing Dragon  dictation.

## 2019-11-12 NOTE — NURSING NOTE
Client arrived to [facility] from cath lab.  Groin site was clean, dry, and intact. AngioSeal used in right groin cath site. Client was very anxious and would not wear oxygen via nasal cannula or simple face mask. Vital signs assessed, and during clients panic attack clients oxygen dropped, once panic attack subsided O2 saturation returned to normal limits. RN educated client the importance of breathing through supplemental oxygen, to help maintain normal oxygen saturation and prevent anxiety from rising gain. Client continued to express anxiousness and threatened to get up out of bed. PRN anxiety medication given.

## 2019-11-12 NOTE — PLAN OF CARE
Problem: Patient Care Overview  Goal: Plan of Care Review  Outcome: Ongoing (interventions implemented as appropriate)   11/12/19 0005   Coping/Psychosocial   Plan of Care Reviewed With patient   Plan of Care Review   Progress no change

## 2019-11-12 NOTE — NURSING NOTE
MD Vesta notified of clients anxiety continuing and use of PRN vistaril being given at 1046 to alleviate anxiety. A second dose of Vistaril IM 25mg was ordered to be given once, and administered. Order placed at 1103. Client became short of air and ekg was ordered and performed stat on patient and MD informed that EKG was placed in bin at desk to be scan into patients chart.

## 2019-11-13 NOTE — ANESTHESIA PROCEDURE NOTES
Central Line      Patient reassessed immediately prior to procedure    Patient location during procedure: ICU  Start time: 11/13/2019 3:46 PM  Stop Time:11/13/2019 3:59 PM  Indications: vascular access and MD/Surgeon request  Staff  Anesthesiologist: Ilir Payan MD  Preanesthetic Checklist  Completed: patient identified, site marked, surgical consent, pre-op evaluation, timeout performed, IV checked, risks and benefits discussed and monitors and equipment checked  Central Line Prep  Sterile Tech:cap, gloves, gown, mask and sterile barriers  Prep: chloraprep  Patient monitoring: blood pressure monitoring, continuous pulse oximetry and EKG  Central Line Procedure  Laterality:right  Location:internal jugular  Catheter Type:triple lumen  Catheter Size:7 Fr  Guidance:landmark technique  Assessment  Post procedure:biopatch applied, line sutured and occlusive dressing applied  Assessement:blood return through all ports, free fluid flow and chest x-ray ordered  Complications:no  Patient Tolerance:patient tolerated the procedure well with no apparent complications

## 2019-11-13 NOTE — PLAN OF CARE
Problem: Patient Care Overview  Goal: Plan of Care Review  Outcome: Ongoing (interventions implemented as appropriate)   11/12/19 1937   Plan of Care Review   Progress improving     Goal: Individualization and Mutuality  Outcome: Ongoing (interventions implemented as appropriate)    Goal: Interprofessional Rounds/Family Conf  Outcome: Ongoing (interventions implemented as appropriate)      Problem: Pain, Chronic (Adult)  Goal: Identify Related Risk Factors and Signs and Symptoms  Outcome: Ongoing (interventions implemented as appropriate)    Goal: Acceptable Pain/Comfort Level and Functional Ability  Outcome: Ongoing (interventions implemented as appropriate)      Problem: Cardiac: ACS (Acute Coronary Syndrome) (Adult)  Goal: Signs and Symptoms of Listed Potential Problems Will be Absent, Minimized or Managed (Cardiac: ACS)  Outcome: Ongoing (interventions implemented as appropriate)      Problem: Cardiac: Heart Failure (Adult)  Goal: Signs and Symptoms of Listed Potential Problems Will be Absent, Minimized or Managed (Cardiac: Heart Failure)  Outcome: Ongoing (interventions implemented as appropriate)      Problem: Mood Impairment (Anxiety Signs/Symptoms) (Adult)  Goal: Improved Mood Symptoms (Anxiety Signs/Symptoms)  Outcome: Ongoing (interventions implemented as appropriate)

## 2019-11-13 NOTE — ANESTHESIA POSTPROCEDURE EVALUATION
Patient: Matthew Danielle    Procedure Summary     Date:  11/13/19 Room / Location:  [facility] CATH/EP LAB 3 / [facility] CATH INVASIVE LOCATION    Anesthesia Start:  1117 Anesthesia Stop:  1450    Procedures:       Biventricular Device Insertion (N/A )      Intra-Aortic Baloon Pump Insertion (Right ) Diagnosis:  Chest pain, unspecified type    Provider:  Elysia Sanchez MD Provider:  Ilir Payan MD    Anesthesia Type:  MAC ASA Status:  4          Anesthesia Type: MAC  Last vitals  BP   106/72 (11/13/19 0700)   Temp   97.7 °F (36.5 °C) (11/13/19 0700)   Pulse   (!) 165 (11/13/19 0927)   Resp   16 (11/13/19 0700)     SpO2   95 % (11/13/19 0700)     Post Anesthesia Care and Evaluation    Patient location during evaluation: ICU  Patient participation: complete - patient participated  Level of consciousness: awake and combative  Pain score: 0  Pain management: adequate  Airway patency: patent  Anesthetic complications: No anesthetic complications  PONV Status: none  Cardiovascular status: acceptable and hypotensive  Respiratory status: acceptable, spontaneous ventilation and ETT  Hydration status: acceptable    Comments: Transported to ICU#9.   All lines intact. Ambu with 100%O2. IAPB intact. Dr. Sanchez, Dr. Galeas, Dr. Moreland in room. Report given.

## 2019-11-13 NOTE — ANESTHESIA PREPROCEDURE EVALUATION
Anesthesia Evaluation     history of anesthetic complications: PONV  NPO Solid Status: > 8 hours  NPO Liquid Status: > 2 hours           Airway   Mallampati: II  TM distance: >3 FB  Neck ROM: full  Possible difficult intubation  Dental    (+) poor dentition        Pulmonary - normal exam    breath sounds clear to auscultation  (+) a smoker (0.5 ppd) Current Abstained day of surgery, shortness of breath, sleep apnea,   (-) COPD, asthma    ROS comment: cough  Cardiovascular   Exercise tolerance: poor (<4 METS)    ECG reviewed  Rhythm: regular  Rate: normal    (+) CAD, dysrhythmias, orthopnea, hyperlipidemia,   (-) valvular problems/murmurs, angina, murmur, cardiac stents, DVT    ROS comment: Sinus tachycardia with premature supraventricular complexes  Possible Left atrial enlargement  Rightward axis  Nonspecific intraventricular block  Abnormal ECG  No previous ECGs available    Heart cath   Impression:  1.  100% occlusion of the right coronary artery with the distal right coronary artery filling in from right to right collateral.  2.  Nonobstructive disease in the circumflex artery in the proximal portion of 20 to 30% before the origin of the obtuse marginal branch.  3.  The first obtuse marginal branch which was bifurcating in the proximal portion with 40% stenosis.  4.  Proximal left anterior descending artery with 20 to 30% stenosis  5.  Elevated left ventricular end-diastolic pressure.     Recommendations: Patient was recommended medical management for the coronary artery disease and patient was recommended LifeVest and aggressive pharmacological therapy for severe left ventricular systolic dysfunction.    Have discussed with the patient the possible need for an AICD.  Patient left ventricular systolic dysfunction even with the underlying coronary artery disease is probably nonischemic in origin as the patient left ventricular systolic dysfunction cannot be explained on the current coronary anatomy and  stenosis.    EF 20  · The left ventricular cavity is mild-to-moderately dilated.  · Left atrial cavity size is mildly dilated.  · Moderate-to-severe mitral valve regurgitation is present  · Mild to moderate tricuspid valve regurgitation is present.  · Mild aortic valve regurgitation is present.  · The following left ventricular wall segments are hypokinetic: mid anterior, apical anterior, basal anterolateral, mid anterolateral, apical lateral, basal inferolateral, mid inferolateral, apical inferior, mid inferior, apical septal, basal inferoseptal, mid inferoseptal, apex hypokinetic, mid anteroseptal, basal anterior, basal inferior and basal inferoseptal.    Neuro/Psych- negative ROS  (-) seizures, TIA, CVA, headaches, psychiatric history  GI/Hepatic/Renal/Endo    (-) GERD, hepatitis, liver disease, no renal disease, diabetes, no thyroid disorder    Musculoskeletal     (-) arthralgias  Abdominal    Substance History   (+) drug use (marijuana)  (-) alcohol use     OB/GYN          Other        (-) arthritis, history of cancer                  Anesthesia Plan    ASA 4     MAC   total IV anesthesia  intravenous induction     Anesthetic plan, all risks, benefits, and alternatives have been provided, discussed and informed consent has been obtained with: patient and spouse/significant other.

## 2019-11-13 NOTE — PLAN OF CARE
Problem: Patient Care Overview  Goal: Plan of Care Review  Outcome: Ongoing (interventions implemented as appropriate)   11/11/19 1549 11/12/19 1937 11/12/19 2045   Coping/Psychosocial   Plan of Care Reviewed With --  --  patient;significant other   Plan of Care Review   Progress --  improving --    OTHER   Outcome Summary patient has had some anxiety/panic attacks today; ordered nicotine patch to try to help --  --      Goal: Individualization and Mutuality  Outcome: Ongoing (interventions implemented as appropriate)    Goal: Discharge Needs Assessment  Outcome: Ongoing (interventions implemented as appropriate)      Problem: Pain, Chronic (Adult)  Goal: Identify Related Risk Factors and Signs and Symptoms  Outcome: Ongoing (interventions implemented as appropriate)    Goal: Acceptable Pain/Comfort Level and Functional Ability  Outcome: Ongoing (interventions implemented as appropriate)      Problem: Cardiac: ACS (Acute Coronary Syndrome) (Adult)  Goal: Signs and Symptoms of Listed Potential Problems Will be Absent, Minimized or Managed (Cardiac: ACS)  Outcome: Ongoing (interventions implemented as appropriate)      Problem: Cardiac: Heart Failure (Adult)  Goal: Signs and Symptoms of Listed Potential Problems Will be Absent, Minimized or Managed (Cardiac: Heart Failure)  Outcome: Ongoing (interventions implemented as appropriate)      Problem: Mood Impairment (Anxiety Signs/Symptoms) (Adult)  Goal: Improved Mood Symptoms (Anxiety Signs/Symptoms)  Outcome: Ongoing (interventions implemented as appropriate)

## 2019-11-13 NOTE — PLAN OF CARE
Problem: Ventilation, Mechanical Invasive (Adult)  Intervention: Prevent Airway Displacement/Mechanical Dysfunction   11/13/19 1701   Prevent Airway Displacement/Mechanical Dysfunction   Airway Safety Measures manual resuscitator at bedside     Intervention: Prevent Airway-Related Skin/Tissue Breakdown   11/13/19 1701   Skin Interventions   Device Skin Pressure Protection skin-to-device areas padded     Intervention: Prevent Ventilator-Induced Lung Injury   11/13/19 1701   Respiratory Interventions   Lung Protection Measures low tidal volume provided;lung compliance monitored;optimal PEEP applied;plateau/inspiratory pressure monitored;ventilator synchrony promoted;ventilator waveforms monitored     Intervention: Prevent Ventilator-Associated Pneumonia (VAP)   11/13/19 1701   Positioning   Head of Bed (HOB) HOB not elevated due to hypotension   VAP Prevention Measures   VAP Prevention Contraindications condition unstable     Intervention: Optimize Oxygenation/Ventilation   11/13/19 1701   Respiratory Interventions   Airway/Ventilation Management airway patency maintained       Goal: Signs and Symptoms of Listed Potential Problems Will be Absent, Minimized or Managed (Ventilation, Mechanical Invasive)  Outcome: Ongoing (interventions implemented as appropriate)

## 2019-11-13 NOTE — PROGRESS NOTES
.jrb      [facility] Medicine Services  INPATIENT PROGRESS NOTE    Length of Stay: 0  Date of Admission: 11/10/2019  Primary Care Physician: Provider, No Known    Subjective   Chief Complaint: Anxiousness     HPI:S/P AICD and Balloon Pump placement.  The patient was intubated prior to leaving the procedure suite.  In transport he began to have issues with hypotension.  When he arrived to the ICU he was placed on levo fed and dobutamine.  Pressures were still marginal.  Epinephrine was then added.  Dr. Victoria and Dr. Galeas were called to the bedside since they had both just performed cardiac procedures.       Review of Systems     Unable to obtain comprehensive review of systems secondary to patient's intubated state.  Objective    Temp:  [96.1 °F (35.6 °C)-97.7 °F (36.5 °C)] 97.7 °F (36.5 °C)  Heart Rate:  [] 90  Resp:  [12-20] 12  BP: ()/(51-77) 84/51  FiO2 (%):  [90 %] 90 %    Physical Exam   Constitutional:   Intubated, critically ill appearing   HENT:   Head: Normocephalic and atraumatic.   ETT in place   Eyes: Pupils are equal, round, and reactive to light.   Neck: Neck supple. No thyromegaly present.   Cardiovascular: Regular rhythm.   Murmur heard.  Tachycardic   Pulmonary/Chest: Effort normal and breath sounds normal. He has no wheezes. He has no rales.   Abdominal: Soft. Bowel sounds are normal.   Musculoskeletal: He exhibits no edema or tenderness.   Lymphadenopathy:     He has no cervical adenopathy.   Neurological:   Sedated and intubated.  Unable to fully assess neurologic function   Skin: No rash noted. There is pallor.           Results Review:  I have reviewed the labs, radiology results, and diagnostic studies.    Laboratory Data:   Results from last 7 days   Lab Units 11/13/19  1518 11/13/19  1510 11/13/19  0710 11/12/19  0559   SODIUM mmol/L 138  --  138 140   SODIUM, ARTERIAL mmol/L  --  136  --   --    POTASSIUM mmol/L 3.8  --  3.9 3.7   CHLORIDE  mmol/L 99  --  95* 99   CO2 mmol/L 21.0*  --  26.0 25.0   BUN mg/dL 35*  --  33* 27*   CREATININE mg/dL 1.79*  --  1.36* 1.15   GLUCOSE mg/dL 152*  --  137* 109*   GLUCOSE, ARTERIAL mmol/L  --  144*  --   --    CALCIUM mg/dL 7.5*  --  9.3 9.1   BILIRUBIN mg/dL 1.3*  --   --   --    ALK PHOS U/L 102  --   --   --    ALT (SGPT) U/L 35  --   --   --    AST (SGOT) U/L 41*  --   --   --    ANION GAP mmol/L 18.0*  --  17.0* 16.0*     Estimated Creatinine Clearance: 48.1 mL/min (A) (by C-G formula based on SCr of 1.79 mg/dL (H)).  Results from last 7 days   Lab Units 11/11/19  0530   MAGNESIUM mg/dL 1.6         Results from last 7 days   Lab Units 11/13/19  1518 11/13/19  0710 11/12/19  0559 11/11/19  0530 11/10/19  0740   WBC 10*3/mm3 9.25 11.99* 11.61* 11.59* 9.4   HEMOGLOBIN g/dL 13.2 15.7 15.8 15.4 15.2   HEMATOCRIT % 39.9 44.7 45.7 44.4 46.1   PLATELETS 10*3/mm3 143 187 188 184 176     Results from last 7 days   Lab Units 11/13/19  1518 11/12/19  0559 11/10/19  0740   INR  1.98* 1.28* 1.3       Culture Data:   No results found for: BLOODCX  No results found for: URINECX  No results found for: RESPCX  No results found for: WOUNDCX  No results found for: STOOLCX  No components found for: BODYFLD    Radiology Data:   Imaging Results (Last 24 Hours)     Procedure Component Value Units Date/Time    XR Chest 1 View [[id number]] Collected:  11/13/19 1612     Updated:  11/13/19 1645    Narrative:         [facility]    Patient Name: PORTILLO BROCK    ATTENDING: DAVID GONZALEZ     REFERRING: KB BOYER    ORDERING: KB BOYER    -----------------------    PROCEDURE: Chest, AP portable    DATE OF EXAM: 11/13/2019 at 1610 hours    HISTORY: Central line placement    A single AP, portable supine view of the chest was obtained.  Study is compared to earlier exam of 11/13/2019.    FINDINGS:    Right-sided jugular central line is in place with tip in the spur  vena cava. No pneumothorax is appreciated on  this single supine  image.    Endotracheal tube remains in good position above the victor manuel. The  lungs are satisfactorily expanded and clear of infiltrates or  effusions. The heart size is enlarged and the vasculature does  not appear congested.The costophrenic angles are clear. Left  subclavian AICD remains in place.      Impression:       1. Right-sided jugular central line in good position. No definite  pneumothorax is seen on this supine image.  2. Cardiomegaly. The lungs appear free of focal infiltrates or  effusions.  3. Endotracheal tube remains in good position.      Electronically signed by:  Matthew Tineo MD  11/13/2019 4:44 PM  CST Workstation: [id number]    XR Chest AP [[id number]] Collected:  11/13/19 1516     Updated:  11/13/19 1556    Narrative:       Chest single view.    CLINICAL INDICATION: Postpacemaker, ICD insertion.    COMPARISON: None    FINDINGS: Cardiac silhouette is enlarged in size. Pulmonary  vascularity is unremarkable.     Endotracheal tube identified with tip 5.7 cm above the  bifurcation of the victor manuel in satisfactory position. Lung fields  grossly clear.      Impression:       CONCLUSION: Pacemaker, AICD leads observed, with tips in right  atrium, right ventricle.    The lung fields are clear. There is no pneumothorax.    Electronically signed by:  Tanvir Hoskins MD  11/13/2019 3:55 PM CST  Workstation: [id number]          I have reviewed the patient's current medications.     Assessment/Plan     Active Hospital Problems    Diagnosis   • **Chest pain     Added automatically from request for surgery [id number]     • Elevated troponin       Plan:      1. 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   BBlocker, aldactone, ARB can be resumed once HoTN resolved.  ASA and statin can be resumed once oral access obtained or patient is extubated.       2.  Ventricular tachycardia -AICD placed today   3.  Severe HoTN - post procedure - currently requiring epi, norepi, and dobutamine  for pressure support.  Caution IVF given extremely low EF.  Holding antihypertensives.    4.  Polysubstance abuse - will need to educate and encourage cessation once patient awake and extubated.       Pt is critically ill    45min of CC time spent on this patient after procedure.      Wander Lemons MD

## 2019-11-13 NOTE — CONSULTS
3-5 day hospital follow up. Appropriate, new CHF. CRT-D implantation today. Will follow as outpatient.           This document has been electronically signed by JAYESH Garcia on November 13, 2019 9:34 AM

## 2019-11-14 PROBLEM — I47.29 VENTRICULAR TACHYCARDIA (PAROXYSMAL) (HCC): Status: ACTIVE | Noted: 2019-01-01

## 2019-11-14 PROBLEM — R57.0 CARDIOGENIC SHOCK (HCC): Status: ACTIVE | Noted: 2019-01-01

## 2019-11-14 NOTE — PLAN OF CARE
Problem: Ventilation, Mechanical Invasive (Adult)  Intervention: Prevent Airway Displacement/Mechanical Dysfunction   11/14/19 1635   Prevent Airway Displacement/Mechanical Dysfunction   Airway Safety Measures manual resuscitator/mask/valve in room     Intervention: Prevent Ventilator-Induced Lung Injury   11/14/19 1635   Respiratory Interventions   Lung Protection Measures low inspiratory pressure provided;low tidal volume provided     Intervention: Optimize Oxygenation/Ventilation   11/14/19 1635   Respiratory Interventions   Airway/Ventilation Management airway patency maintained

## 2019-11-14 NOTE — PROGRESS NOTES
LOS: 1 day   Patient Care Team:  Provider, No Known as PCP - General    Chief Complaint: Status post dual-chamber ST implantation for recurrent ventricular tachycardia, combined ischemic and nonischemic cardiomyopathy, congestive heart failure and severe LV dysfunction.  CS lead placement was not attempted as the patient have significant hypertension requiring intracardiac balloon pump placement.  Patient is hemodynamically better today family present the room.  I spent more than 35-minute with the family and discussing the clinical condition prognosis and addressing her concerns.  Patient remained on balloon pump and intubated with systolic blood pressures of 110.  Earlier had adequate tachycardia and started on IV amiodarone.       Review of Systems:   ROS  Cannot be obtained as the patient is intubated  Objective     Current Facility-Administered Medications   Medication Dose Route Frequency Provider Last Rate Last Dose   • amiodarone (NEXTERONE) 360 mg/200 mL (1.8 mg/mL) infusion  1 mg/min Intravenous Continuous Alisia Burrell APRN 33.3 mL/hr at 11/14/19 0925 1 mg/min at 11/14/19 0925    Followed by   • amiodarone (NEXTERONE) 360 mg/200 mL (1.8 mg/mL) infusion  0.5 mg/min Intravenous Continuous Alisia Burrell APRN       • aspirin EC tablet 81 mg  81 mg Oral Daily Cheikh Lopez MD       • atorvastatin (LIPITOR) tablet 10 mg  10 mg Oral Nightly Elysia Sanchez MD   10 mg at 11/12/19 2049   • DOBUTamine (DOBUTREX) 1 mg/mL infusion  8 mcg/kg/min Intravenous Titrated Elysia Sanchez MD 10.82 mL/hr at 11/14/19 0918 2.5 mcg/kg/min at 11/14/19 0918   • EPINEPHrine (ADRENALIN) 5 mg in sodium chloride 0.9 % 250 mL (0.02 mg/mL) infusion  0.02-0.5 mcg/kg/min Intravenous Titrated Wander Lemons MD   Stopped at 11/14/19 0306   • fentaNYL citrate (PF) (SUBLIMAZE) injection 25 mcg  25 mcg Intravenous Q1H PRN Alisia Burrell APRN   25 mcg at 11/14/19 1314   • heparin 25294 units/250 mL (100 units/mL) in  0.45 % NaCl infusion  15 Units/kg/hr Intravenous Titrated Elysia Sanchez MD 10.81 mL/hr at 11/14/19 1338 15 Units/kg/hr at 11/14/19 1338    And   • heparin (porcine) 5000 UNIT/ML injection 4,000 Units  4,000 Units Intravenous PRN Elysia Sanchez MD        And   • heparin (porcine) 5000 UNIT/ML injection 2,200 Units  30 Units/kg Intravenous PRN Elysia Sanchez MD   2,200 Units at 11/14/19 0717   • Influenza Vac Subunit Quad (FLUCELVAX) injection 0.5 mL  0.5 mL Intramuscular During Hospitalization Emmanuel Maurice DO       • LORazepam (ATIVAN) 100 mg in sodium chloride 0.9 % 100 mL (1 mg/mL) infusion  1 mg/hr Intravenous Titrated Scarlett Galeas MD 5 mL/hr at 11/14/19 0924 5 mg/hr at 11/14/19 0924   • magnesium oxide (MAGOX) tablet 400 mg  400 mg Oral BID Cheikh Lopez MD   400 mg at 11/13/19 0932   • midazolam (VERSED) injection 2 mg  2 mg Intravenous Q2H PRN Alisia Burrell APRN   2 mg at 11/14/19 1207   • nicotine (NICODERM CQ) 21 MG/24HR patch 1 patch  1 patch Transdermal Q24H Wander Lemons MD   1 patch at 11/14/19 0814   • norepinephrine (LEVOPHED) 8 mg/250 mL (32 mcg/mL) in sodium chloride 0.9% infusion (premix)  0.02-0.3 mcg/kg/min Intravenous Titrated Wander Lemons MD   Stopped at 11/14/19 0100   • ondansetron (ZOFRAN) injection 4 mg  4 mg Intravenous Q6H PRN Emmanuel Maurice DO   4 mg at 11/13/19 1200   • pantoprazole (PROTONIX) injection 40 mg  40 mg Intravenous Once Wander Lemons MD       • Vasopressin (PITRESSIN) 20 Units in sodium chloride 0.9 % 100 mL (0.2 Units/mL) infusion  0.02-0.1 Units/min Intravenous Continuous PRN Scarlett Galeas MD   Stopped at 11/14/19 0331       Vital Sign Min/Max for last 24 hours  Temp  Min: 97.8 °F (36.6 °C)  Max: 99.4 °F (37.4 °C)   BP  Min: 75/60  Max: 144/91   Pulse  Min: 80  Max: 97   Resp  Min: 12  Max: 18   SpO2  Min: 34 %  Max: 100 %   No Data Recorded   Weight  Min: 67.3 kg (148 lb 5.9 oz)  Max: 67.3 kg (148 lb 5.9 oz)  "    Flowsheet Rows      First Filed Value   Admission Height  170.2 cm (67\") Documented at 11/10/2019 1146   Admission Weight  74.6 kg (164 lb 6.4 oz) Documented at 11/10/2019 1146            Intake/Output Summary (Last 24 hours) at 11/14/2019 1404  Last data filed at 11/14/2019 1300  Gross per 24 hour   Intake 2607.32 ml   Output 1495 ml   Net 1112.32 ml       Physical Exam:     General Appearance:   Intubated   Lungs:    Decreased bilateral breath sound with rhonchi    Heart:    Regular rhythm and normal rate, normal S1 and S2, no            murmur, no gallop, no rub, no click   Chest Wall:    No abnormalities observed   Abdomen:     Normal bowel sounds, no masses, no organomegaly, soft        non-tender, non-distended, no guarding, no rebound                tenderness   Extremities:   Moves all extremities well, no edema, no cyanosis, no             redness   Pulses:   Pulses palpable and equal bilaterally   Skin:   No bleeding, bruising or rash        Results Review:   I reviewed the patient's new clinical results.  Lab Results   Component Value Date    WBC 13.25 (H) 11/14/2019    HGB 12.9 (L) 11/14/2019    HCT 38.1 11/14/2019    MCV 92.9 11/14/2019     (L) 11/14/2019     Lab Results   Component Value Date    GLUCOSE 112 (H) 11/14/2019    BUN 35 (H) 11/14/2019    CREATININE 1.68 (H) 11/14/2019    EGFRIFNONA 43 (L) 11/14/2019    BCR 20.8 11/14/2019    CO2 33.0 (H) 11/14/2019    CALCIUM 8.3 (L) 11/14/2019    ALBUMIN 3.40 (L) 11/14/2019    AST 41 (H) 11/13/2019    ALT 35 11/13/2019     No results found for: TSH  Lab Results   Component Value Date    INR 1.98 (H) 11/13/2019    INR 1.28 (H) 11/12/2019    INR 1.3 11/10/2019    PROTIME 22.3 (H) 11/13/2019    PROTIME 15.8 (H) 11/12/2019    PROTIME 12.9 (H) 11/10/2019     Lab Results   Component Value Date    PTT 74.0 (H) 11/14/2019       EKG:     Telemetry:    Ejection Fraction  No results found for: EF    Echo EF Estimated  No results found for: " ECHOEFEST      Present on Admission:  • Elevated troponin    Assessment/Plan   #1 recurrent ventricular tachycardia status post ICD placement  #2 hypertension currently on dobutamine with intracardiac balloon pump placement  #3 hypotension with improving systolic blood pressure and on Dobutrex he was on Levophed vasopressor  #4 ventricular tachycardia with appropriate detection defibrillation started on amiodarone    As discussed above in detail spent time more than 35 minutes with the family discussing the clinical condition prognosis and addressing her concerns.    Elysia Sanchez MD  11/14/19  2:04 PM      Part of this note may be an electronic transcription/translation of spoken language to printed text using the Dragon Dictation system.

## 2019-11-14 NOTE — PLAN OF CARE
Problem: Patient Care Overview  Goal: Plan of Care Review  Outcome: Ongoing (interventions implemented as appropriate)   11/14/19 3362   Coping/Psychosocial   Plan of Care Reviewed With daughter;son   Plan of Care Review   Progress improving   OTHER   Outcome Summary Able to decrease balloon pump to a 1:3 rate. Pt weaned off of the vecuronium and is being sedated with ativan with fentanyl and versed for breakthrough pain/sedation needs. Pt's VSS. Dr. Galeas discussed removing balloon pump if pt does well overnight. Amio drip started to help control v-tach runs. Urine output adequate. Will monitor.        Problem: Pain, Chronic (Adult)  Goal: Identify Related Risk Factors and Signs and Symptoms  Outcome: Ongoing (interventions implemented as appropriate)      Problem: Cardiac: ACS (Acute Coronary Syndrome) (Adult)  Goal: Signs and Symptoms of Listed Potential Problems Will be Absent, Minimized or Managed (Cardiac: ACS)  Outcome: Ongoing (interventions implemented as appropriate)      Problem: Ventilation, Mechanical Invasive (Adult)  Goal: Signs and Symptoms of Listed Potential Problems Will be Absent, Minimized or Managed (Ventilation, Mechanical Invasive)  Outcome: Ongoing (interventions implemented as appropriate)      Problem: Skin Injury Risk (Adult)  Goal: Identify Related Risk Factors and Signs and Symptoms  Outcome: Ongoing (interventions implemented as appropriate)    Goal: Skin Health and Integrity  Outcome: Ongoing (interventions implemented as appropriate)      Problem: Fall Risk (Adult)  Goal: Identify Related Risk Factors and Signs and Symptoms  Outcome: Ongoing (interventions implemented as appropriate)    Goal: Absence of Fall  Outcome: Ongoing (interventions implemented as appropriate)      Problem: Intra-Aortic Balloon Pump (Adult)  Goal: Signs and Symptoms of Listed Potential Problems Will be Absent, Minimized or Managed (Intra-Aortic Balloon Pump)  Outcome: Ongoing (interventions implemented as  appropriate)      Problem: Restraint, Nonbehavioral (Nonviolent)  Goal: Rationale and Justification  Outcome: Ongoing (interventions implemented as appropriate)    Goal: Nonbehavioral (Nonviolent) Restraint: Absence of Injury/Harm  Outcome: Ongoing (interventions implemented as appropriate)    Goal: Nonbehavioral (Nonviolent) Restraint: Achievement of Discontinuation Criteria  Outcome: Ongoing (interventions implemented as appropriate)    Goal: Nonbehavioral (Nonviolent) Restraint: Preservation of Dignity and Wellbeing  Outcome: Ongoing (interventions implemented as appropriate)

## 2019-11-14 NOTE — NURSING NOTE
JAMI contacted regarding patient's GCS. States coordinator Jennifer Rodrigez will be calling for additional information.

## 2019-11-14 NOTE — PLAN OF CARE
Problem: Patient Care Overview  Goal: Plan of Care Review  Outcome: Ongoing (interventions implemented as appropriate)   11/13/19 1900   Coping/Psychosocial   Plan of Care Reviewed With spouse;family;daughter;son   Plan of Care Review   Progress declining   OTHER   Outcome Summary Patient arrived from cath lab intubated, agitated, and attempting to get up out of the bed. Sedation and paralytics initiated; patient was started on levophed, dobutamine, epinephrine, vasopressin, and heparin gtts; ativan gtt started for sedation and possible benzo withdrawls; 2 doses of albumin given, persistent hypotension; balloon pump advanced in room by cath lab RN with Dr. Galeas supervising; IABP arterial line and augmentation pressures not reading, Dr. Galeas aware, not concerned at this time, patient remains on a 1:1 with ECG trigger; BIS monitor in place; condition unstable at this time       Problem: Pain, Chronic (Adult)  Goal: Identify Related Risk Factors and Signs and Symptoms  Outcome: Ongoing (interventions implemented as appropriate)    Goal: Acceptable Pain/Comfort Level and Functional Ability  Outcome: Ongoing (interventions implemented as appropriate)      Problem: Cardiac: ACS (Acute Coronary Syndrome) (Adult)  Goal: Signs and Symptoms of Listed Potential Problems Will be Absent, Minimized or Managed (Cardiac: ACS)  Outcome: Ongoing (interventions implemented as appropriate)      Problem: Cardiac: Heart Failure (Adult)  Goal: Signs and Symptoms of Listed Potential Problems Will be Absent, Minimized or Managed (Cardiac: Heart Failure)  Outcome: Ongoing (interventions implemented as appropriate)      Problem: Ventilation, Mechanical Invasive (Adult)  Goal: Signs and Symptoms of Listed Potential Problems Will be Absent, Minimized or Managed (Ventilation, Mechanical Invasive)  Outcome: Ongoing (interventions implemented as appropriate)      Problem: Skin Injury Risk (Adult)  Goal: Identify Related Risk Factors and Signs  and Symptoms  Outcome: Ongoing (interventions implemented as appropriate)    Goal: Skin Health and Integrity  Outcome: Ongoing (interventions implemented as appropriate)      Problem: Fall Risk (Adult)  Goal: Identify Related Risk Factors and Signs and Symptoms  Outcome: Ongoing (interventions implemented as appropriate)    Goal: Absence of Fall  Outcome: Ongoing (interventions implemented as appropriate)      Problem: Intra-Aortic Balloon Pump (Adult)  Goal: Signs and Symptoms of Listed Potential Problems Will be Absent, Minimized or Managed (Intra-Aortic Balloon Pump)  Outcome: Ongoing (interventions implemented as appropriate)      Problem: Restraint, Nonbehavioral (Nonviolent)  Goal: Rationale and Justification  Outcome: Ongoing (interventions implemented as appropriate)    Goal: Nonbehavioral (Nonviolent) Restraint: Absence of Injury/Harm  Outcome: Ongoing (interventions implemented as appropriate)    Goal: Nonbehavioral (Nonviolent) Restraint: Achievement of Discontinuation Criteria  Outcome: Ongoing (interventions implemented as appropriate)    Goal: Nonbehavioral (Nonviolent) Restraint: Preservation of Dignity and Wellbeing  Outcome: Ongoing (interventions implemented as appropriate)

## 2019-11-14 NOTE — CONSULTS
Malnutrition Severity Assessment    Patient Name:  Matthew Danielle  YOB: 1965  MRN: [id number]  Admit Date:  11/10/2019    Patient meets criteria for : (P) Severe Malnutrition    Comments:  Pt assessed with severe malnutrition due to muscle loss temples, scapula, shoulder, intake < or equal to 50% of meals greater to or equal to 5 days, severe wt loss of 11% the past mo.    Malnutrition Severity Assessment  Malnutrition Type: (P) Acute Disease or Injury - Related Malnutrition      Electronically signed by:  Karly Dyer RD  11/14/19 10:47 AM

## 2019-11-14 NOTE — PROGRESS NOTES
"[facility] [place] [facility] Progress Note   LOS: 1 day   Patient Care Team:  Provider, No Known as PCP - General    Chief Complaint: syncope     Subjective     Interval History:   Patient Intubated/sedated/paralyzed  Children at bedside. Here from [place]. Spoke to them regarding current condition, plan for today, and likelihood of poor outcome. For now, we will take his condition one day at a time and monitor his response. He has been ill for over 1 year according to a close friend.     Objective     Vital Sign Min/Max for last 24 hours  Temp  Min: 97.8 °F (36.6 °C)  Max: 99.4 °F (37.4 °C)   BP  Min: 75/60  Max: 144/91   Pulse  Min: 80  Max: 165   Resp  Min: 12  Max: 18   SpO2  Min: 34 %  Max: 100 %   No Data Recorded   Weight  Min: 67.3 kg (148 lb 5.9 oz)  Max: 67.3 kg (148 lb 5.9 oz)     Flowsheet Rows      First Filed Value   Admission Height  170.2 cm (67\") Documented at 11/10/2019 1146   Admission Weight  74.6 kg (164 lb 6.4 oz) Documented at 11/10/2019 1146            11/12/19  0600 11/13/19  0602 11/14/19  0700   Weight: 74.9 kg (165 lb 1.6 oz) 72.1 kg (159 lb) 67.3 kg (148 lb 5.9 oz)       Physical Exam:  Physical Exam   Constitutional: He appears well-developed and well-nourished. He is intubated.   HENT:   Head: Normocephalic.   Neck: No JVD present.   Cardiovascular: Normal rate and regular rhythm. Exam reveals decreased pulses.   Pulmonary/Chest: Effort normal and breath sounds normal. He is intubated.   Abdominal: Soft.   Musculoskeletal: He exhibits no edema.   Neurological:   Intubated, sedated, paralyzed   Skin: Skin is warm and dry. Capillary refill takes more than 3 seconds.        Results Review:     Results from last 7 days   Lab Units 11/14/19  0557 11/14/19  0347 11/14/19  0105  11/13/19  1518  11/13/19  0710   SODIUM mmol/L 138  --   --   --  138  --  138   SODIUM, ARTERIAL mmol/L  --  138 140   < >  --    < >  --    POTASSIUM mmol/L 4.0  --   --   --  3.8  --  3.9   CHLORIDE mmol/L 97*  --   --   --  99 "  --  95*   CO2 mmol/L 33.0*  --   --   --  21.0*  --  26.0   BUN mg/dL 35*  --   --   --  35*  --  33*   CREATININE mg/dL 1.68*  --   --   --  1.79*  --  1.36*   CALCIUM mg/dL 8.3*  --   --   --  7.5*  --  9.3   BILIRUBIN mg/dL  --   --   --   --  1.3*  --   --    ALK PHOS U/L  --   --   --   --  102  --   --    ALT (SGPT) U/L  --   --   --   --  35  --   --    AST (SGOT) U/L  --   --   --   --  41*  --   --    GLUCOSE mg/dL 112*  --   --   --  152*  --  137*   GLUCOSE, ARTERIAL mmol/L  --  117* 131*   < >  --    < >  --     < > = values in this interval not displayed.       Estimated Creatinine Clearance: 47.8 mL/min (A) (by C-G formula based on SCr of 1.68 mg/dL (H)).    Results from last 7 days   Lab Units 11/14/19  0747 11/14/19  0557 11/14/19  0300 11/11/19  0530   MAGNESIUM mg/dL 2.1  --  2.0 1.6   PHOSPHORUS mg/dL  --  4.8*  --   --              Results from last 7 days   Lab Units 11/14/19  0557 11/13/19  1518 11/13/19  0710 11/12/19  0559 11/11/19  0530   WBC 10*3/mm3 13.25* 9.25 11.99* 11.61* 11.59*   HEMOGLOBIN g/dL 12.9* 13.2 15.7 15.8 15.4   PLATELETS 10*3/mm3 132* 143 187 188 184       Results from last 7 days   Lab Units 11/13/19  1518 11/12/19  0559 11/10/19  0740   INR  1.98* 1.28* 1.3     No results found for: PROBNP    I/O last 3 completed shifts:  In: 3467.3 [P.O.:360; I.V.:2857.3; IV Piggyback:250]  Out: 1620 [Urine:1620]    Cardiographics:  ECG/EMG Results (last 24 hours)     Procedure Component Value Units Date/Time    ECG 12 Lead [[id number]] Collected:  11/12/19 1121     Updated:  11/13/19 1438    Narrative:       Test Reason : SOA; CP  Blood Pressure : **/** mmHG  Vent. Rate : 114 BPM     Atrial Rate : 114 BPM     P-R Int : 142 ms          QRS Dur : 158 ms      QT Int : 382 ms       P-R-T Axes : 075 103 -48 degrees     QTc Int : 526 ms    Sinus tachycardia with premature supraventricular complexes  Possible Left atrial enlargement  Rightward axis  Nonspecific intraventricular block  NS STT  changes  Abnormal ECG  No previous ECGs available  Confirmed by YEE HARDIN, B. N. ([id number]) on 11/13/2019 2:37:55 PM    Referred By:             Confirmed By:ELISABETH FRAIRE MD    SCANNED EKG [[id number]] Resulted:  11/10/19      Updated:  11/13/19 1443    SCANNED EKG [[id number]] Resulted:  11/10/19      Updated:  11/13/19 1443    Adult Transthoracic Echo Limited W/ Cont if Necessary Per Protocol [[id number]] Resulted:  11/13/19 1459     Updated:  11/13/19 1502     BSA 1.8 m^2       CV ECHO RENÉ - RVDD 4.3 cm      IVSd 0.91 cm      LVIDd 7.8 cm      LVIDs 7.0 cm      LVPWd 0.97 cm      IVS/LVPW 0.94     FS 10.7 %      EDV(Teich) 329.4 ml      ESV(Teich) 255.4 ml      EF(Teich) 22.5 %      EDV(cubed) 481.9 ml      ESV(cubed) 343.0 ml      EF(cubed) 28.8 %      LV mass(C)d 364.4 grams      LV mass(C)dI 198.7 grams/m^2      SV(Teich) 74.0 ml      SI(Teich) 40.3 ml/m^2      SV(cubed) 138.9 ml      SI(cubed) 75.7 ml/m^2       CV ECHO RENÉ - BZI_BMI 24.9 kilograms/m^2       CV ECHO RENÉ - BSA(HAYCOCK) 1.9 m^2       CV ECHO RENÉ - BZI_METRIC_WEIGHT 72.1 kg       CV ECHO RENÉ - BZI_METRIC_HEIGHT 170.2 cm      Target HR (85%) 141 bpm      Max. Pred. HR (100%) 166 bpm     Narrative:       · The left ventricular cavity is moderately dilated.  · Right ventricular cavity is moderately dilated.  · Mildly reduced right ventricular systolic function noted.  · Moderate-to-severe mitral valve regurgitation is present  · Moderate tricuspid valve regurgitation is present.  · There is no evidence of pericardial effusion.       EP/CRM Study [[id number]] Resulted:  11/13/19 1539     Updated:  11/13/19 1655    Narrative:         Date of Procedure: 11/13/19     Referring Physician: Dr. Killian    /ELECTROPHYSIOLOGIST         PROCEDURE(S) PERFORMED:    1. Implantation of a DDD implantable cardioverter-defibrillator.          2. Defibrillation threshold testing was not performed patient after   sedation become  hypotensive  Stat echocardiogram study was obtained no evidence of pericardial effusion   severe left trending systolic dysfunction  Intra-aortic balloon pump was placed by Dr. Galeas  INDICATIONS FOR PROCDEDURE: Recurrent ventricular tachycardia with severe   LV dysfunction chronically occluded RCA cardiomyopathy may become   diminished ischemia and nonischemic    MEDICATION(S) GIVEN TO PATIENT DURING THIS PROCEDURE:  1. Conscious sedation documented by anesthesia  2. Ancef and gentamicin.    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    Procedure and risk risk regarding the ICD implantation discussed with the   patient and the family.  Included but not him to infection, bleeding,   stroke, cardiac perforation, lead dislodgment and related to IV conscious   sedation understand willing to proceed forward    The patient was brought to the cardiovascular laboratory in a fasting,   non-sedated state. The risks and benefits of conscious sedation and   implantation of a DDD ICD were explained to the patient and written,   informed consent was obtained. The patient was then prepped and draped in   the usual sterile manner. The left infraclavicular fossa area was then   anesthetized with 1% Lidocaine and the skin was incised using a scalpel.   The pocket was created along the prepectoralis fascia using both sharp and   blunt dissection. Access to the left subclavian vein was ultimately   obtained x3 using the modified Seldinger technique, and a 7-Libyan sheath   and 6-Libyan sheaths were then passed over 3 guidewires and advanced to   the left subclavian vein. The right ventricular lead   was advanced to the   low right ventricular septum where it was screwed in position. The right   ventricular lead pacing threshold was measured at 0.9 volt at 0.5   milliseconds with an R-wave measured at 11 millivolts and a lead impedance   measured at 650 ohms. The right atrial lead was then inserted into the low   right atrium where it was screwed in position. The right atrial lead   pacing threshold was measured at 0.8 volt at 0.5 milliseconds with a   P-wave measured at 5 millivolts and a lead impedance measured at 450 ohms.   No diaphragmatic stimulation was noted with pacing at 10 volts through   either the right atrial or right ventricular leads.  The leads were placed   into the ICD generator which was then placed in the pocket after the   pocket was irrigated with vancomycin solution. The pocket was then closed   with 2-0 Vicryl, and the skin was closed with a 3-0 Vicryl and 4-0 Vicryl.   Steri-Strips and a pressure dressing were then applied. Estimated blood   loss was minimal. Hemostasis was adequate. The ICD was then programmed to   its final setting. The patient tolerated the procedure well and was   transferred to her room in stable condition.    COMPLICATIONS: Patient had right atrium and right ventricular lead was   implanted without difficulty.  The patient was fighting at the table and   patient received sedation by anesthesia and become hypertensive requiring   inotropic agent and placement of intracardiac balloon with systolic blood   pressures came up to 90.  Stat echocardiogram study was obtained no   evidence of pericardial effusion    SPECIMENS: None    FINAL PROGRAM PARAMETERS: The device was set at DDDR with the lower rate   of 60 beats per minute and upper rate of 120 beats per minute with   prolonged A-V delay. The VT zone was set at 150 beats per minute to 170   beats per minute. The VF zone was set at greater then 200 beats per minute   with maximum therapies.     FINAL IMPRESSIONS:     1. Successful insertion of a DDD ICD.         2.   Defibrillation tract was transferred testing was not performed   given the hypotension after sedation's    RECOMMENDATION(S):  1. The patient will be monitored on telemetry.  2. If stable, the patient will be discharged to home in the morning.     Elysia Sanchez MD  11/13/19  3:40 PM    Part of this note may be an electronic transcription/translation of spoken   language to printed text using the Dragon Dictation system.            [facility]  CARDIAC CATHETERIZATION NOTE-IABP insertion    11/13/2019    Refering Physician:Laura Davis    Procedure:IABP insertion ,Emergently  Indications: Sustained Hypotension and Cardiogenic Shock    Blood loss-0cc  Specimen-none    Site of Entry: R femoral-    Catheters used:IABP,8F SHEATH  Course: The patient in the the cath lab and prepped and draped in the   usual fashion post ICD placement. A Seldinger technique was used to place   a 8fr sheath over a .035 guide wire through the sheath and under   fluoroscopic guidance.  IABP Balloon catheter was placed over the guidewire into the descending   Aorta  Just pass the left subclavian.  Findings: IABP raised SBP on Dobutrex and Levophed from 80 to 110.        Plan: IABP SUPPORT.    This document has been electronically signed by Scarlett Galeas MD on   November 13, 2019 2:28 PM       Part of this note may be an electronic transcription/translation of spoken   language to printed text using the Dragon Dictation System.          SCANNED EKG [[id number]] Resulted:  11/10/19      Updated:  11/13/19 2231        Results for orders placed during the hospital encounter of 11/10/19   Adult Transthoracic Echo Limited W/ Cont if Necessary Per Protocol    Narrative · The left ventricular cavity is moderately dilated.  · Right ventricular cavity is moderately dilated.  · Mildly reduced right ventricular systolic function noted.  · Moderate-to-severe mitral valve  regurgitation is present  · Moderate tricuspid valve regurgitation is present.  · There is no evidence of pericardial effusion.          Xr Chest 1 View    Result Date: 11/14/2019  CONCLUSION: Cardiomegaly. Prominence of pulmonary vasculature. Subtle increasing retrocardiac densities probably atelectasis. Electronically signed by:  Tanvir Hoskins MD  11/14/2019 7:14 AM CST Workstation: [id number]    Xr Chest 1 View    Result Date: 11/13/2019  1. Right-sided jugular central line in good position. No definite pneumothorax is seen on this supine image. 2. Cardiomegaly. The lungs appear free of focal infiltrates or effusions. 3. Endotracheal tube remains in good position. Electronically signed by:  Matthew Tineo MD  11/13/2019 4:44 PM CST Workstation: [id number]    Xr Chest Ap    Result Date: 11/13/2019  CONCLUSION: Pacemaker, AICD leads observed, with tips in right atrium, right ventricle. The lung fields are clear. There is no pneumothorax. Electronically signed by:  Tanvir Hoskins MD  11/13/2019 3:55 PM CST Workstation: [id number]      Medication Review:     Current Facility-Administered Medications:   •  amiodarone in dextrose 5% (NEXTERONE) loading dose 150mg/100mL, 150 mg, Intravenous, Once **FOLLOWED BY** amiodarone (NEXTERONE) 360 mg/200 mL (1.8 mg/mL) infusion, 1 mg/min, Intravenous, Continuous **FOLLOWED BY** amiodarone (NEXTERONE) 360 mg/200 mL (1.8 mg/mL) infusion, 0.5 mg/min, Intravenous, Continuous, Alisia Burrell APRN  •  artificial tears ophthalmic ointment, , Both Eyes, Q4H, Scarlett Galeas MD  •  aspirin EC tablet 81 mg, 81 mg, Oral, Daily, Cheikh Lopez MD  •  atorvastatin (LIPITOR) tablet 10 mg, 10 mg, Oral, Nightly, Elysia Sanchez MD, 10 mg at 11/12/19 2049  •  [MAR Hold] ceFAZolin (ANCEF) 1 g/100 mL 0.9% NS IVPB (mbp), 1 g, Intravenous, Once, Elysia Sanchez MD  •  DOBUTamine (DOBUTREX) 1 mg/mL infusion, 8 mcg/kg/min, Intravenous, Titrated, Elysia Sanchez MD, Stopped at 11/14/19 0626  •   EPINEPHrine (ADRENALIN) 5 mg in sodium chloride 0.9 % 250 mL (0.02 mg/mL) infusion, 0.02-0.5 mcg/kg/min, Intravenous, Titrated, Wander Lemons MD, Stopped at 11/14/19 0306  •  heparin 55679 units/250 mL (100 units/mL) in 0.45 % NaCl infusion, 15 Units/kg/hr, Intravenous, Titrated, Last Rate: 10.81 mL/hr at 11/14/19 0720, 15 Units/kg/hr at 11/14/19 0720 **AND** heparin (porcine) 5000 UNIT/ML injection 4,000 Units, 4,000 Units, Intravenous, PRN **AND** heparin (porcine) 5000 UNIT/ML injection 2,200 Units, 30 Units/kg, Intravenous, PRN, Elysia Sanchez MD, 2,200 Units at 11/14/19 0717  •  Influenza Vac Subunit Quad (FLUCELVAX) injection 0.5 mL, 0.5 mL, Intramuscular, During Hospitalization, Emmanuel Maurice DO  •  LORazepam (ATIVAN) 100 mg in sodium chloride 0.9 % 100 mL (1 mg/mL) infusion, 1 mg/hr, Intravenous, Titrated, Scarlett Galeas MD, Last Rate: 4 mL/hr at 11/14/19 0745, 4 mg/hr at 11/14/19 0745  •  magnesium oxide (MAGOX) tablet 400 mg, 400 mg, Oral, BID, Cheikh Lopez MD, 400 mg at 11/13/19 0932  •  nicotine (NICODERM CQ) 21 MG/24HR patch 1 patch, 1 patch, Transdermal, Q24H, Wander Lemons MD, 1 patch at 11/14/19 0814  •  norepinephrine (LEVOPHED) 8 mg/250 mL (32 mcg/mL) in sodium chloride 0.9% infusion (premix), 0.02-0.3 mcg/kg/min, Intravenous, Titrated, Wander Lemons MD, Stopped at 11/14/19 0100  •  ondansetron (ZOFRAN) injection 4 mg, 4 mg, Intravenous, Q6H PRN, Emmanuel Maurice DO, 4 mg at 11/13/19 1200  •  pantoprazole (PROTONIX) injection 40 mg, 40 mg, Intravenous, Once, Wander Lemons MD  •  Vasopressin (PITRESSIN) 20 Units in sodium chloride 0.9 % 100 mL (0.2 Units/mL) infusion, 0.02-0.1 Units/min, Intravenous, Continuous PRN, Scarlett Galeas MD, Stopped at 11/14/19 0331  •  vecuronium (NORCURON) 100 mg in sodium chloride 0.9 % 100 mL (1 mg/mL) infusion, 0.6-1.2 mcg/kg/min, Intravenous, Titrated, Scarlett Galeas MD, Last Rate: 5.19 mL/hr at 11/13/19 0566,  1.2 mcg/kg/min at 11/13/19 1786  •  vecuronium (NORCURON) bolus from bag 1 mg/mL 3.61 mg, 50 mcg/kg, Intravenous, Once PRN, Scarlett Galeas MD  •  vecuronium (NORCURON) injection 7.2 mg, 100 mcg/kg, Intravenous, Once, Scarlett Galeas MD    Assessment/Plan       Cardiogenic shock (CMS/HCC)  - IABP to 1:2. Going to 1:3 at 3pm  - restarting Dobutamine at 2.5mcg/kg/min. Will follow PVCs/VT  - no BB at this time.   - 20mg IV Lasix due to interstitial edema on CXR  - Concerning VAD/AHF: he is not an OHT candidate secondary to drugs/smoking. Could be considered for VAD therapy if condition does not stay stable with IABP. Right now, he is tolerating the weaning of IABP. Will continue for support while he is weaned from the ventilator. This will be challenging secondary to his 2PPD smoking history.         Ventricular tachycardia (paroxysmal) (CMS/HCC)  - Amiodarone 150mg bolus IV with drip  - ICD implanted. Defib x1        Mechanical Ventilation  -Stop paralytic. Fentanyl and Versed IV pushes PRN. Keep ativan gtt secondary to ETOH and drug use.  - Consider Fentanyl gtt if PRN pushes become frequent. With improve BP, can consider Versed gtt  - Keep sedated and still    CKD III  - stable today following hypotension      Plan for disposition:Where: continue ICU          This document has been electronically signed by JAYESH Garcia on November 14, 2019 8:55 AM

## 2019-11-14 NOTE — NURSING NOTE
Dr. Lopez at bedside. MD aware that IABP arterial line does not have blood return and that accurate augmented pressures are not able to be obtained.

## 2019-11-14 NOTE — PLAN OF CARE
Problem: Patient Care Overview  Goal: Plan of Care Review  Outcome: Ongoing (interventions implemented as appropriate)   11/14/19 0539   Coping/Psychosocial   Plan of Care Reviewed With daughter      11/14/19 0539   Coping/Psychosocial   Plan of Care Reviewed With daughter   OTHER   Outcome Summary Able to wean off pressors overnight. Currently remains sedated and paralyzed on vent. Dobutamine at 2mcg/kg/min       Problem: Pain, Chronic (Adult)  Goal: Identify Related Risk Factors and Signs and Symptoms  Outcome: Ongoing (interventions implemented as appropriate)    Goal: Acceptable Pain/Comfort Level and Functional Ability  Outcome: Ongoing (interventions implemented as appropriate)      Problem: Cardiac: ACS (Acute Coronary Syndrome) (Adult)  Goal: Signs and Symptoms of Listed Potential Problems Will be Absent, Minimized or Managed (Cardiac: ACS)  Outcome: Ongoing (interventions implemented as appropriate)      Problem: Cardiac: Heart Failure (Adult)  Goal: Signs and Symptoms of Listed Potential Problems Will be Absent, Minimized or Managed (Cardiac: Heart Failure)  Outcome: Ongoing (interventions implemented as appropriate)      Problem: Mood Impairment (Anxiety Signs/Symptoms) (Adult)  Goal: Improved Mood Symptoms (Anxiety Signs/Symptoms)  Outcome: Ongoing (interventions implemented as appropriate)      Problem: Ventilation, Mechanical Invasive (Adult)  Goal: Signs and Symptoms of Listed Potential Problems Will be Absent, Minimized or Managed (Ventilation, Mechanical Invasive)  Outcome: Ongoing (interventions implemented as appropriate)

## 2019-11-14 NOTE — PROGRESS NOTES
MEDICINE DAILY PROGRESS NOTE  NAME: Matthew Danielle  : 1965  MRN: [id number]     LOS: 1 day     PROVIDER OF SERVICE: David Moreland MD    Chief Complaint: Chest pain    Subjective:   HPI: Patient went for AICD placement yesterday and had balloon pump placed prior to procedure.  Patient developed hypotension post procedure and was started on vasoactive agents.  Patient was placed in the intensive care unit.  Patient also intubated post procedure.    Interval History:  History taken from: chart RN  . Patient intubated/sedate post AICD placement and balloon pump. Patient weaning off pressor, balloon pump, and ventilator support.    F - NPO  A - Fentanyl  S - Ativan gtt. Versed.  T - Heparin gtt.  H - Flat at time of exam.  U - Protonix  G - N/A.    FiO2 (%):  [30 %-50 %] 40 %  S RR:  [12-16] 16  PEEP/CPAP (cm H2O):  [0 cm H20-5 cm H20] 5 cm H20  SD SUP:  [0 cm H20] 0 cm H20  MAP (cm H2O):  [3.9-9.8] 9.8      Intake/Output       19 07 - 19 0719 - 19 0700 19 07 - 11/15/19 0700      3348-3011 3329-4630 Total 6068-5154 6292-2292 Total 4728-1435 2625-9061 Total       Intake    P.O.  --  360 360  --  -- --  --  -- --    I.V.    --   1733.3  1124 2857.3  --  -- --    IV Piggyback  --  -- --  --  250 250  --  -- --    Total Intake  360 2360 1733.3 1374 3107.3 -- -- --       Output    Urine  --  500 500  405  715 1120  375  -- 375    Total Output -- 500 500  375 -- 375        Intake/Output       19 07 - 19 0700 19 0701 - 19 0700 19 07 - 11/15/19 0700      8026-5112 4748-5673 Total 4773-94101900 Total 1267-8464 1972-7183 Total       Intake    P.O.  --  360 360  --  -- --  --  -- --    I.V.  2000  1733.3  1124 2857.3  --  -- --    IV Piggyback  --  -- --  --  250 250  --  -- --    Total Intake  360 4930 1733.3 1374 3107.3 -- -- --       Output    Urine  --  500 500  405  715 1120  375  -- 375     Total Output -- 500 500  375 -- 375          Intake/Output       11/12/19 0701 - 11/13/19 0700 11/13/19 0701 - 11/14/19 0700 11/14/19 0701 - 11/15/19 0700      5446-7685 4796-4031 Total 7587-8542 8328-3213 Total 2675-0460 3386-7076 Total       Intake    P.O.  --  360 360  --  -- --  --  -- --    I.V.  2000 -- 2000  1733.3  1124 2857.3  --  -- --    IV Piggyback  --  -- --  --  250 250  --  -- --    Total Intake 2000 360 2360 1733.3 1374 3107.3 -- -- --       Output    Urine  --  500 500  405  715 1120  375  -- 375    Total Output -- 500 500  375 -- 375          Review of Systems:   Review of Systems   Unable to perform ROS: Intubated       Objective:     Vital Signs  Vitals:    11/14/19 1100 11/14/19 1130 11/14/19 1200 11/14/19 1257   BP: 96/70 102/74 102/76    BP Location: Left arm Left arm Left arm    Patient Position: Lying Lying Lying    Pulse: 87 84 83 85   Resp: 16 16 16    Temp:   99.2 °F (37.3 °C)    TempSrc:   Oral    SpO2: 99% 99% 99% 100%   Weight:       Height:           Physical Exam  Physical Exam   Constitutional: He appears well-developed and well-nourished. No distress.   HENT:   Head: Normocephalic and atraumatic.   Right Ear: External ear normal.   Left Ear: External ear normal.   Nose: Nose normal.   Eyes: Conjunctivae are normal. Right eye exhibits no discharge. Left eye exhibits no discharge.   Neck: Neck supple. No thyromegaly present.   Cardiovascular: Normal rate and regular rhythm.   Pulmonary/Chest: Effort normal. He has no wheezes. He has no rales. He exhibits no tenderness.   ETT in place. OGT in place. Right IJ in place.   Abdominal: Soft. Bowel sounds are normal. He exhibits no distension.   Musculoskeletal: Normal range of motion. He exhibits no edema.   Neurological:   Unable to assess. Intubated/sedated.   Skin: Skin is warm and dry. No rash noted. He is not diaphoretic. No erythema. No pallor.   Psychiatric:   Unable to assess. Intubated/sedated.   Nursing  note and vitals reviewed.      Medication Review    Current Facility-Administered Medications:   •  [COMPLETED] amiodarone in dextrose 5% (NEXTERONE) loading dose 150mg/100mL, 150 mg, Intravenous, Once, Stopped at 11/14/19 0926 **FOLLOWED BY** amiodarone (NEXTERONE) 360 mg/200 mL (1.8 mg/mL) infusion, 1 mg/min, Intravenous, Continuous, Last Rate: 33.3 mL/hr at 11/14/19 0925, 1 mg/min at 11/14/19 0925 **FOLLOWED BY** amiodarone (NEXTERONE) 360 mg/200 mL (1.8 mg/mL) infusion, 0.5 mg/min, Intravenous, Continuous, Alisia Burrell APRN  •  aspirin EC tablet 81 mg, 81 mg, Oral, Daily, Cheikh Lopez MD  •  atorvastatin (LIPITOR) tablet 10 mg, 10 mg, Oral, Nightly, Elysia Sanchez MD, 10 mg at 11/12/19 2049  •  DOBUTamine (DOBUTREX) 1 mg/mL infusion, 8 mcg/kg/min, Intravenous, Titrated, Elysia Sanchez MD, Last Rate: 10.82 mL/hr at 11/14/19 0918, 2.5 mcg/kg/min at 11/14/19 0918  •  EPINEPHrine (ADRENALIN) 5 mg in sodium chloride 0.9 % 250 mL (0.02 mg/mL) infusion, 0.02-0.5 mcg/kg/min, Intravenous, Titrated, Wander Lemons MD, Stopped at 11/14/19 0306  •  fentaNYL citrate (PF) (SUBLIMAZE) injection 25 mcg, 25 mcg, Intravenous, Q1H PRN, Alisia Burrell APRN, 25 mcg at 11/14/19 1314  •  heparin 61370 units/250 mL (100 units/mL) in 0.45 % NaCl infusion, 15 Units/kg/hr, Intravenous, Titrated, Last Rate: 10.81 mL/hr at 11/14/19 1338, 15 Units/kg/hr at 11/14/19 1338 **AND** heparin (porcine) 5000 UNIT/ML injection 4,000 Units, 4,000 Units, Intravenous, PRN **AND** heparin (porcine) 5000 UNIT/ML injection 2,200 Units, 30 Units/kg, Intravenous, PRN, Elysia Sanchez MD, 2,200 Units at 11/14/19 0717  •  Influenza Vac Subunit Quad (FLUCELVAX) injection 0.5 mL, 0.5 mL, Intramuscular, During Hospitalization, Emmanuel Maurice DO  •  LORazepam (ATIVAN) 100 mg in sodium chloride 0.9 % 100 mL (1 mg/mL) infusion, 1 mg/hr, Intravenous, Titrated, Scarlett Galeas MD, Last Rate: 5 mL/hr at 11/14/19 0924, 5 mg/hr at  11/14/19 0924  •  magnesium oxide (MAGOX) tablet 400 mg, 400 mg, Oral, BID, Cheikh Lopez MD, 400 mg at 11/13/19 0932  •  midazolam (VERSED) injection 2 mg, 2 mg, Intravenous, Q2H PRN, Alisia Burrell APRN, 2 mg at 11/14/19 1207  •  nicotine (NICODERM CQ) 21 MG/24HR patch 1 patch, 1 patch, Transdermal, Q24H, Wander Lemons MD, 1 patch at 11/14/19 0814  •  norepinephrine (LEVOPHED) 8 mg/250 mL (32 mcg/mL) in sodium chloride 0.9% infusion (premix), 0.02-0.3 mcg/kg/min, Intravenous, Titrated, Wander Lemons MD, Stopped at 11/14/19 0100  •  ondansetron (ZOFRAN) injection 4 mg, 4 mg, Intravenous, Q6H PRN, Emmanuel Maurice DO, 4 mg at 11/13/19 1200  •  pantoprazole (PROTONIX) injection 40 mg, 40 mg, Intravenous, Once, Wander Lemons MD  •  Vasopressin (PITRESSIN) 20 Units in sodium chloride 0.9 % 100 mL (0.2 Units/mL) infusion, 0.02-0.1 Units/min, Intravenous, Continuous PRN, Scarlett Galeas MD, Stopped at 11/14/19 0331     Diagnostic Data    Lab Results (last 24 hours)     Procedure Component Value Units Date/Time    Respiratory Culture - Sputum, ET Suction [[id number]] Collected:  11/14/19 1315    Specimen:  Sputum from ET Suction Updated:  11/14/19 1352     Gram Stain Many (4+) WBCs seen      No epithelial cells seen      Mixed bacterial minor    aPTT [[id number]]  (Abnormal) Collected:  11/14/19 1301    Specimen:  Blood Updated:  11/14/19 1332     PTT 74.0 seconds     Narrative:       The recommended Heparin therapeutic range is 68-97 seconds.    Scan Slide [[id number]] Collected:  11/14/19 0557    Specimen:  Blood Updated:  11/14/19 1037     Target Cells Slight/1+     WBC Morphology Normal     Platelet Estimate Decreased    Magnesium [[id number]]  (Normal) Collected:  11/14/19 0747    Specimen:  Blood Updated:  11/14/19 0811     Magnesium 2.1 mg/dL     CBC & Differential [[id number]] Collected:  11/14/19 0557    Specimen:  Blood Updated:  11/14/19 0716    Narrative:       The following orders  were created for panel order CBC & Differential.  Procedure                               Abnormality         Status                     ---------                               -----------         ------                     CBC Auto Differential[[id number]]        Abnormal            Final result                 Please view results for these tests on the individual orders.    CBC Auto Differential [[id number]]  (Abnormal) Collected:  11/14/19 0557    Specimen:  Blood Updated:  11/14/19 0716     WBC 13.25 10*3/mm3      RBC 4.10 10*6/mm3      Hemoglobin 12.9 g/dL      Hematocrit 38.1 %      MCV 92.9 fL      MCH 31.5 pg      MCHC 33.9 g/dL      RDW 15.2 %      RDW-SD 51.9 fl      MPV 11.2 fL      Platelets 132 10*3/mm3      Neutrophil % 80.6 %      Lymphocyte % 9.4 %      Monocyte % 9.1 %      Eosinophil % 0.0 %      Basophil % 0.2 %      Immature Grans % 0.7 %      Neutrophils, Absolute 10.70 10*3/mm3      Lymphocytes, Absolute 1.24 10*3/mm3      Monocytes, Absolute 1.20 10*3/mm3      Eosinophils, Absolute 0.00 10*3/mm3      Basophils, Absolute 0.02 10*3/mm3      Immature Grans, Absolute 0.09 10*3/mm3      nRBC 0.2 /100 WBC     Renal Function Panel [[id number]]  (Abnormal) Collected:  11/14/19 0557    Specimen:  Blood Updated:  11/14/19 0641     Glucose 112 mg/dL      BUN 35 mg/dL      Creatinine 1.68 mg/dL      Sodium 138 mmol/L      Potassium 4.0 mmol/L      Chloride 97 mmol/L      CO2 33.0 mmol/L      Calcium 8.3 mg/dL      Albumin 3.40 g/dL      Phosphorus 4.8 mg/dL      Anion Gap 8.0 mmol/L      BUN/Creatinine Ratio 20.8     eGFR Non African Amer 43 mL/min/1.73     Narrative:       GFR Normal >60  Chronic Kidney Disease <60  Kidney Failure <15    aPTT [[id number]]  (Abnormal) Collected:  11/14/19 0557    Specimen:  Blood Updated:  11/14/19 0630     PTT 59.7 seconds     Narrative:       The recommended Heparin therapeutic range is 68-97 seconds.    Magnesium [[id number]]  (Normal) Collected:  11/14/19 0300    Specimen:   Blood Updated:  11/14/19 0411     Magnesium 2.0 mg/dL     Blood Gas, Arterial With Co-Ox [[id number]]  (Abnormal) Collected:  11/14/19 0347    Specimen:  Arterial Blood Updated:  11/14/19 0407     Site Arterial Line     Juan's Test N/A     pH, Arterial 7.357 pH units      pCO2, Arterial 58.5 mm Hg      Comment: 83 Value above reference range        pO2, Arterial 85.3 mm Hg      HCO3, Arterial 32.7 mmol/L      Comment: 83 Value above reference range        Base Excess, Arterial 5.5 mmol/L      Comment: 83 Value above reference range        O2 Saturation, Arterial 96.0 %      Hemoglobin, Blood Gas 13.2 g/dL      Comment: 84 Value below reference range        Hematocrit, Blood Gas 40.4 %      Oxyhemoglobin 93.9 %      Comment: 84 Value below reference range        Methemoglobin 1.20 %      Carboxyhemoglobin 0.9 %      A-a Gradiant 97.1 mmHg      Sodium, Arterial 138 mmol/L      Potassium, Arterial 3.7 mmol/L      Ionized Calcium 4.30 mg/dL      Comment: 84 Value below reference range        Glucose, Arterial 117 mmol/L      Barometric Pressure for Blood Gas 753 mmHg      Modality Ventilator     FIO2 35 %      Ventilator Mode AC     Set Tidal Volume 550     Set Mech Resp Rate 16.0     PEEP 0.0     PIP 15 cmH2O      Comment: Meter: [id number]     :  [id number]        Note --     Collected by LILLY TERRAZAS     pH, Temp Corrected --     pCO2, Temperature Corrected --     pO2, Temperature Corrected --    Blood Gas, Arterial With Co-Ox [[id number]]  (Abnormal) Collected:  11/14/19 0105    Specimen:  Arterial Blood Updated:  11/14/19 0118     Site Arterial Line     Juan's Test N/A     pH, Arterial 7.301 pH units      Comment: 84 Value below reference range        pCO2, Arterial 68.8 mm Hg      Comment: 86 Value above critical limit        pO2, Arterial 97.4 mm Hg      HCO3, Arterial 33.9 mmol/L      Comment: 83 Value above reference range        Base Excess, Arterial 5.2 mmol/L      Comment: 83 Value above reference  range        O2 Saturation, Arterial 96.7 %      Hemoglobin, Blood Gas 13.3 g/dL      Comment: 84 Value below reference range        Hematocrit, Blood Gas 40.8 %      Oxyhemoglobin 95.5 %      Methemoglobin 0.30 %      Carboxyhemoglobin 0.9 %      A-a Gradiant 109.2 mmHg      Sodium, Arterial 140 mmol/L      Potassium, Arterial 3.4 mmol/L      Comment: 84 Value below reference range        Ionized Calcium 4.32 mg/dL      Comment: 84 Value below reference range        Glucose, Arterial 131 mmol/L      Barometric Pressure for Blood Gas 752 mmHg      Modality Ventilator     FIO2 40 %      Ventilator Mode AC     Set Tidal Volume 550     Set Mech Resp Rate 12.0     PIP 15 cmH2O      Comment: Meter: [id number]     :  [id number]        Note --     Collected by LILLY TERRAZAS     pH, Temp Corrected --     pCO2, Temperature Corrected --     pO2, Temperature Corrected --    Blood Gas, Arterial [[id number]]  (Abnormal) Collected:  11/13/19 2314    Specimen:  Arterial Blood Updated:  11/13/19 2324     Site Arterial Line     Juan's Test N/A     pH, Arterial 7.251 pH units      Comment: 84 Value below reference range        pCO2, Arterial 62.9 mm Hg      Comment: 83 Value above reference range        pO2, Arterial 69.9 mm Hg      Comment: 84 Value below reference range        HCO3, Arterial 27.6 mmol/L      Comment: 83 Value above reference range        Base Excess, Arterial -1.0 mmol/L      Comment: 84 Value below reference range        O2 Saturation, Arterial 89.5 %      Comment: 84 Value below reference range        Barometric Pressure for Blood Gas 752 mmHg      Modality Ventilator     FIO2 30 %      Ventilator Mode AC     Set Tidal Volume 550     Set Mech Resp Rate 12.0     PEEP 0.0     PIP 14 cmH2O      Comment: Meter: [id number]     :  [id number]        Collected by NC    aPTT [[id number]]  (Abnormal) Collected:  11/13/19 2230    Specimen:  Blood Updated:  11/13/19 2312     PTT 46.2 seconds     Narrative:        The recommended Heparin therapeutic range is 68-97 seconds.    Blood Gas, Arterial With Co-Ox [[id number]]  (Abnormal) Collected:  11/13/19 2200    Specimen:  Arterial Blood Updated:  11/13/19 2217     Site Arterial Line     Juan's Test N/A     pH, Arterial 7.240 pH units      Comment: 84 Value below reference range        pCO2, Arterial 55.4 mm Hg      Comment: 83 Value above reference range        pO2, Arterial 84.6 mm Hg      HCO3, Arterial 23.7 mmol/L      Base Excess, Arterial -4.3 mmol/L      Comment: 84 Value below reference range        O2 Saturation, Arterial 94.0 %      Hemoglobin, Blood Gas 13.3 g/dL      Comment: 84 Value below reference range        Hematocrit, Blood Gas 40.8 %      Oxyhemoglobin 92.0 %      Comment: 84 Value below reference range        Methemoglobin 1.20 %      Carboxyhemoglobin 0.9 %      A-a Gradiant 65.3 mmHg      Sodium, Arterial 143 mmol/L      Potassium, Arterial 3.1 mmol/L      Comment: 84 Value below reference range        Ionized Calcium 4.25 mg/dL      Comment: 84 Value below reference range        Glucose, Arterial 173 mmol/L      Barometric Pressure for Blood Gas 752 mmHg      Modality Ventilator     FIO2 30 %      Ventilator Mode AC     Set Tidal Volume 550     Set Mech Resp Rate 122.0     PIP 15 cmH2O      Comment: Meter: [id number]     :  [id number]        Note --     Collected by LILLY TERRAZAS     pH, Temp Corrected --     pCO2, Temperature Corrected --     pO2, Temperature Corrected --    Blood Gas, Arterial [[id number]]  (Abnormal) Collected:  11/13/19 2024    Specimen:  Arterial Blood Updated:  11/13/19 2033     Site Arterial Line     Juan's Test N/A     pH, Arterial 7.157 pH units      Comment: 85 Value below critical limit        pCO2, Arterial 54.5 mm Hg      Comment: 83 Value above reference range        pO2, Arterial 157.0 mm Hg      Comment: 83 Value above reference range        HCO3, Arterial 19.3 mmol/L      Comment: 84 Value below reference  range        Base Excess, Arterial -9.8 mmol/L      Comment: 84 Value below reference range        O2 Saturation, Arterial 98.4 %      Barometric Pressure for Blood Gas 752 mmHg      Modality Ventilator     FIO2 50 %      Ventilator Mode AC     Set Tidal Volume 550     Set Mech Resp Rate 12.0     PEEP 0.0     PIP 15 cmH2O      Comment: Meter: [id number]     :  [id number]        Collected by ZECHARIAH RRT    Blood Gas, Arterial With Co-Ox [[id number]]  (Abnormal) Collected:  11/13/19 1920    Specimen:  Arterial Blood Updated:  11/13/19 1929     Site Arterial Line     Juan's Test N/A     pH, Arterial 7.090 pH units      Comment: 85 Value below critical limit        pCO2, Arterial 54.0 mm Hg      Comment: 83 Value above reference range        pO2, Arterial 169.0 mm Hg      Comment: 83 Value above reference range        HCO3, Arterial 16.4 mmol/L      Comment: 84 Value below reference range        Base Excess, Arterial -13.7 mmol/L      Comment: 84 Value below reference range        O2 Saturation, Arterial 98.2 %      Hemoglobin, Blood Gas 14.5 g/dL      Hematocrit, Blood Gas 44.6 %      Oxyhemoglobin 96.3 %      Methemoglobin 1.40 %      Carboxyhemoglobin 0.7 %      A-a Gradiant 124.7 mmHg      Sodium, Arterial 136 mmol/L      Potassium, Arterial 3.6 mmol/L      Ionized Calcium 4.15 mg/dL      Comment: 84 Value below reference range        Glucose, Arterial 186 mmol/L      Barometric Pressure for Blood Gas 752 mmHg      Modality Ventilator     FIO2 50 %      Ventilator Mode AC     Set Tidal Volume 550     Set Mech Resp Rate 12.0     PEEP 0.0     PIP 15 cmH2O      Comment: Meter: [id number]     :  [id number]        Note --     Collected by DAMIAN TERRAZAS RRT     pH, Temp Corrected --     pCO2, Temperature Corrected --     pO2, Temperature Corrected --    Blood Gas, Arterial [[id number]]  (Abnormal) Collected:  11/13/19 1920    Specimen:  Arterial Blood Updated:  11/13/19 1926     Site Arterial Line      Juan's Test N/A     pH, Arterial 7.094 pH units      Comment: 85 Value below critical limit        pCO2, Arterial 54.1 mm Hg      Comment: 83 Value above reference range        pO2, Arterial 172.0 mm Hg      Comment: 83 Value above reference range        HCO3, Arterial 16.6 mmol/L      Comment: 84 Value below reference range        Base Excess, Arterial -13.5 mmol/L      Comment: 84 Value below reference range        O2 Saturation, Arterial 98.3 %      Barometric Pressure for Blood Gas 752 mmHg      Modality Ventilator     FIO2 50 %      Ventilator Mode AC     Set Tidal Volume 550     Set Mech Resp Rate 12.0     PEEP 0.0     PIP 15 cmH2O      Comment: Meter: [id number]     :  [id number]        Collected by DAMIAN TERRAZAS RRT    aPTT [[id number]]  (Abnormal) Collected:  11/13/19 1518    Specimen:  Blood Updated:  11/13/19 1550     .7 seconds     Narrative:       The recommended Heparin therapeutic range is 68-97 seconds.    Protime-INR [[id number]]  (Abnormal) Collected:  11/13/19 1518    Specimen:  Blood Updated:  11/13/19 1549     Protime 22.3 Seconds      INR 1.98    Narrative:       Therapeutic range for most indications is 2.0-3.0 INR,  or 2.5-3.5 for mechanical heart valves.    Comprehensive Metabolic Panel [[id number]]  (Abnormal) Collected:  11/13/19 1518    Specimen:  Blood from Arm, Left Updated:  11/13/19 1547     Glucose 152 mg/dL      BUN 35 mg/dL      Creatinine 1.79 mg/dL      Sodium 138 mmol/L      Potassium 3.8 mmol/L      Chloride 99 mmol/L      CO2 21.0 mmol/L      Calcium 7.5 mg/dL      Total Protein 5.3 g/dL      Albumin 3.30 g/dL      ALT (SGPT) 35 U/L      AST (SGOT) 41 U/L      Alkaline Phosphatase 102 U/L      Total Bilirubin 1.3 mg/dL      eGFR Non African Amer 40 mL/min/1.73      Globulin 2.0 gm/dL      A/G Ratio 1.7 g/dL      BUN/Creatinine Ratio 19.6     Anion Gap 18.0 mmol/L     Narrative:       GFR Normal >60  Chronic Kidney Disease <60  Kidney Failure <15    CBC &  Differential [[id number]] Collected:  11/13/19 1518    Specimen:  Blood Updated:  11/13/19 1523    Narrative:       The following orders were created for panel order CBC & Differential.  Procedure                               Abnormality         Status                     ---------                               -----------         ------                     CBC Auto Differential[[id number]]        Abnormal            Final result                 Please view results for these tests on the individual orders.    CBC Auto Differential [[id number]]  (Abnormal) Collected:  11/13/19 1518    Specimen:  Blood Updated:  11/13/19 1523     WBC 9.25 10*3/mm3      RBC 4.28 10*6/mm3      Hemoglobin 13.2 g/dL      Hematocrit 39.9 %      MCV 93.2 fL      MCH 30.8 pg      MCHC 33.1 g/dL      RDW 15.5 %      RDW-SD 52.4 fl      MPV 11.2 fL      Platelets 143 10*3/mm3      Neutrophil % 82.8 %      Lymphocyte % 10.4 %      Monocyte % 6.1 %      Eosinophil % 0.0 %      Basophil % 0.2 %      Immature Grans % 0.5 %      Neutrophils, Absolute 7.66 10*3/mm3      Lymphocytes, Absolute 0.96 10*3/mm3      Monocytes, Absolute 0.56 10*3/mm3      Eosinophils, Absolute 0.00 10*3/mm3      Basophils, Absolute 0.02 10*3/mm3      Immature Grans, Absolute 0.05 10*3/mm3      nRBC 0.2 /100 WBC     Blood Gas, Arterial With Co-Ox [[id number]]  (Abnormal) Collected:  11/13/19 1510    Specimen:  Arterial Blood Updated:  11/13/19 1519     Site Arterial Line     Juan's Test N/A     pH, Arterial 7.284 pH units      Comment: 84 Value below reference range        pCO2, Arterial 47.4 mm Hg      Comment: 83 Value above reference range        pO2, Arterial 331.0 mm Hg      Comment: 83 Value above reference range        HCO3, Arterial 22.5 mmol/L      Base Excess, Arterial -4.5 mmol/L      Comment: 84 Value below reference range        O2 Saturation, Arterial 99.9 %      Comment: 83 Value above reference range        Hemoglobin, Blood Gas 13.9 g/dL      Comment: 84  Value below reference range        Hematocrit, Blood Gas 42.5 %      Oxyhemoglobin >98.5 %      Methemoglobin 0.40 %      Carboxyhemoglobin 0.7 %      A-a Gradiant 327.6 mmHg      Sodium, Arterial 136 mmol/L      Potassium, Arterial 3.6 mmol/L      Ionized Calcium 4.06 mg/dL      Comment: 84 Value below reference range        Glucose, Arterial 144 mmol/L      Barometric Pressure for Blood Gas 753 mmHg      Modality N/A     FIO2 100 %      Ventilator Mode AC     Set Tidal Volume 550     Set Mech Resp Rate 12.0     Note --     Collected by MM     Comment: Meter: [id number]     :  [id number]        pH, Temp Corrected --     pCO2, Temperature Corrected --     pO2, Temperature Corrected --          I reviewed the patient's new clinical results.    Assessment/Plan:     Active Hospital Problems    Diagnosis POA   • **Chest pain [R07.9] Unknown     Added automatically from request for surgery [id number]     • Cardiogenic shock (CMS/HCC) [R57.0] Unknown   • Ventricular tachycardia (paroxysmal) (CMS/HCC) [I47.2] Unknown   • Elevated troponin [R79.89] Yes       #. Cardiogenic shock. Cards following. AICD. Balloon pump. Dobutamine. Lasix.  #. Vtach. Paroxysmal. AICD discharge overnight. Amio gtt.  #. Post op respiratory failure. Intubated/sedated. Will consult pulmonology for vent management.  #. NAYELY on CKD III. Improving towards baseline. Monitor. Consult nephrology as needed.  Results from last 7 days   Lab Units 11/14/19  0557 11/13/19  1518 11/13/19  0710 11/12/19  0559 11/11/19  0530 11/10/19  1219   CREATININE mg/dL 1.68* 1.79* 1.36* 1.15 1.06 1.10   #. NICM w/ severe LV dysfunction. EF less then 20%.   Results for orders placed during the hospital encounter of 11/10/19   Adult Transthoracic Echo Limited W/ Cont if Necessary Per Protocol    Narrative · The left ventricular cavity is moderately dilated.  · Right ventricular cavity is moderately dilated.  · Mildly reduced right ventricular systolic function  noted.  · Moderate-to-severe mitral valve regurgitation is present  · Moderate tricuspid valve regurgitation is present.  · There is no evidence of pericardial effusion.      #. Polysubstance abuse. Cessation counseling when able.  #. ICM.       Will monitor patient's hospital course and adjust treatment as hospital course dictates.    DVT prophylaxis: Heparin gtt  Code status is   Code Status and Medical Interventions:   Ordered at: 11/10/19 1206     Level Of Support Discussed With:    Patient     Code Status:    CPR     Medical Interventions (Level of Support Prior to Arrest):    Full       Plan for disposition:Planning ongoing.      Time:           This document has been electronically signed by David Moreland MD on November 14, 2019 2:06 PM

## 2019-11-14 NOTE — PROGRESS NOTES
Cardiology Progress Note     LOS: 1 day   Patient Care Team:  Mary Alcantar Known as PCP - General    Subjective:   Chart reviewed. Patient seen and examined. Patient remain intubated with an FiO2 of 50%.  Patient currently has an intra-aortic balloon pump in place.  Patient had episodes of ventricular tachycardia and was started on IV amiodarone.  Patient underwent successful dual-chamber AICD placement.  Patient could not get the coronary sinus lead.  Patient currently has a blood pressure of 105/61.    Objective:  Temp:  [97.8 °F (36.6 °C)-99.4 °F (37.4 °C)] 98.5 °F (36.9 °C)  Heart Rate:  [80-97] 82  Resp:  [12-18] 16  BP: ()/(48-91) 96/68  Arterial Line BP: ()/(37-77) 105/61  FiO2 (%):  [30 %-50 %] 50 %    Intake/Output Summary (Last 24 hours) at 11/14/2019 1029  Last data filed at 11/14/2019 0909  Gross per 24 hour   Intake 3107.32 ml   Output 1185 ml   Net 1922.32 ml       Physical Exam:   General Appearance:   Intubated and sedated    Head:    Normocephalic, atraumatic, without obvious abnormality   Eyes:             IMMANUEL. Lids and lashes normal, conjunctivae and sclerae normal, no icterus, no pallor.   Ears:    Ears appear intact with no abnormalities noted.   Throat:   Mucous membranes pink and moist.   Neck:  Supple, trachea midline, no carotid bruit, no organomegaly or JVD.   Lungs:    Decreased air entry bilaterally with coarse rhonchi.    Heart:    Regular rhythm and normal rate, normal S1 and S2, no      murmur, no gallop, no rub, no click.   Abdomen:    Soft, non-tender, non-distended, no guarding, no rebound tenderness. Normal bowel sounds in all four quadrants, no masses, liver and spleen nonpalpable.    Genitalia:    Deferred.   Extremities:   Moves all extremities well, no edema, no cyanosis, no       redness, no clubbing.   Pulses:   Pulses palpable and equal bilaterally.   Skin:   Moist and warm. No bleeding, bruising or rash.   Neurologic/Psychiatric:  Intubated and sedated.           Results Review:    Results from last 7 days   Lab Units 11/14/19  0557  11/13/19  1518   SODIUM mmol/L 138  --  138   SODIUM, ARTERIAL   --    < >  --    POTASSIUM mmol/L 4.0  --  3.8   CHLORIDE mmol/L 97*  --  99   CO2 mmol/L 33.0*  --  21.0*   BUN mg/dL 35*  --  35*   CREATININE mg/dL 1.68*  --  1.79*   CALCIUM mg/dL 8.3*  --  7.5*   BILIRUBIN mg/dL  --   --  1.3*   ALK PHOS U/L  --   --  102   ALT (SGPT) U/L  --   --  35   AST (SGOT) U/L  --   --  41*   GLUCOSE mg/dL 112*  --  152*   GLUCOSE, ARTERIAL   --    < >  --     < > = values in this interval not displayed.     Results from last 7 days   Lab Units 11/10/19  1820 11/10/19  1439 11/10/19  1219 11/10/19  0740   TROPONIN I ng/mL  --   --   --  0.1*   TROPONIN T ng/mL 0.021 0.013 0.016  --      Results from last 7 days   Lab Units 11/10/19  0740   BNP pg/mL 16,042*     Results from last 7 days   Lab Units 11/14/19  0557   WBC 10*3/mm3 13.25*   HEMOGLOBIN g/dL 12.9*   HEMATOCRIT % 38.1   PLATELETS 10*3/mm3 132*     Results from last 7 days   Lab Units 11/14/19  0557 11/13/19  2230 11/13/19  1518 11/12/19  0559 11/10/19  0740   INR   --   --  1.98* 1.28* 1.3   APTT seconds 59.7* 46.2* 105.7*  --  25     Results from last 7 days   Lab Units 11/14/19  0747   MAGNESIUM mg/dL 2.1                   ECHO:  Results for orders placed during the hospital encounter of 11/10/19   Adult Transthoracic Echo Limited W/ Cont if Necessary Per Protocol    Narrative · The left ventricular cavity is moderately dilated.  · Right ventricular cavity is moderately dilated.  · Mildly reduced right ventricular systolic function noted.  · Moderate-to-severe mitral valve regurgitation is present  · Moderate tricuspid valve regurgitation is present.  · There is no evidence of pericardial effusion.          SCANNED EKG   Final Result      SCANNED EKG   Final Result      SCANNED EKG   Final Result      ECG 12 Lead   Final Result   Test Reason : SOA; CP   Blood Pressure : **/** mmHG    Vent. Rate : 114 BPM     Atrial Rate : 114 BPM      P-R Int : 142 ms          QRS Dur : 158 ms       QT Int : 382 ms       P-R-T Axes : 075 103 -48 degrees      QTc Int : 526 ms      Sinus tachycardia with premature supraventricular complexes   Possible Left atrial enlargement   Rightward axis   Nonspecific intraventricular block   NS STT changes   Abnormal ECG   No previous ECGs available   Confirmed by YEE HARDIN, B. N. ([id number]) on 11/13/2019 2:37:55 PM      Referred By:             Confirmed By:ELISABETH FRAIRE MD      SCANNED EKG   Final Result           Medication Review:   Current Facility-Administered Medications   Medication Dose Route Frequency Provider Last Rate Last Dose   • amiodarone (NEXTERONE) 360 mg/200 mL (1.8 mg/mL) infusion  1 mg/min Intravenous Continuous Alisia Burrell APRN 33.3 mL/hr at 11/14/19 0925 1 mg/min at 11/14/19 0925    Followed by   • amiodarone (NEXTERONE) 360 mg/200 mL (1.8 mg/mL) infusion  0.5 mg/min Intravenous Continuous Alisia Burrell APRN       • aspirin EC tablet 81 mg  81 mg Oral Daily Cheikh Lopez MD       • atorvastatin (LIPITOR) tablet 10 mg  10 mg Oral Nightly Elysia Sanchez MD   10 mg at 11/12/19 2049   • [MAR Hold] ceFAZolin (ANCEF) 1 g/100 mL 0.9% NS IVPB (mbp)  1 g Intravenous Once Elysia Sanchez MD       • DOBUTamine (DOBUTREX) 1 mg/mL infusion  8 mcg/kg/min Intravenous Titrated Elysia Sanchez MD 10.82 mL/hr at 11/14/19 0918 2.5 mcg/kg/min at 11/14/19 0918   • EPINEPHrine (ADRENALIN) 5 mg in sodium chloride 0.9 % 250 mL (0.02 mg/mL) infusion  0.02-0.5 mcg/kg/min Intravenous Titrated Wander Lemons MD   Stopped at 11/14/19 0306   • fentaNYL citrate (PF) (SUBLIMAZE) injection 25 mcg  25 mcg Intravenous Q1H PRN Alisia Burrell APRN       • furosemide (LASIX) injection 20 mg  20 mg Intravenous Once Scarlett Galeas MD       • heparin 56037 units/250 mL (100 units/mL) in 0.45 % NaCl infusion  15 Units/kg/hr Intravenous Titrated Akram,  MD Elysia 10.81 mL/hr at 11/14/19 0720 15 Units/kg/hr at 11/14/19 0720    And   • heparin (porcine) 5000 UNIT/ML injection 4,000 Units  4,000 Units Intravenous PRN Elysia Sanchez MD        And   • heparin (porcine) 5000 UNIT/ML injection 2,200 Units  30 Units/kg Intravenous PRN Elysia Sanchez MD   2,200 Units at 11/14/19 0717   • Influenza Vac Subunit Quad (FLUCELVAX) injection 0.5 mL  0.5 mL Intramuscular During Hospitalization Emmanuel Maurice DO       • LORazepam (ATIVAN) 100 mg in sodium chloride 0.9 % 100 mL (1 mg/mL) infusion  1 mg/hr Intravenous Titrated Scarlett Galeas MD 5 mL/hr at 11/14/19 0924 5 mg/hr at 11/14/19 0924   • magnesium oxide (MAGOX) tablet 400 mg  400 mg Oral BID Cheikh Lopez MD   400 mg at 11/13/19 0932   • midazolam (VERSED) injection 2 mg  2 mg Intravenous Q2H PRN Alisia Burrell APRN   2 mg at 11/14/19 0947   • nicotine (NICODERM CQ) 21 MG/24HR patch 1 patch  1 patch Transdermal Q24H Wander Lemons MD   1 patch at 11/14/19 0814   • norepinephrine (LEVOPHED) 8 mg/250 mL (32 mcg/mL) in sodium chloride 0.9% infusion (premix)  0.02-0.3 mcg/kg/min Intravenous Titrated Wander Lemons MD   Stopped at 11/14/19 0100   • ondansetron (ZOFRAN) injection 4 mg  4 mg Intravenous Q6H PRN Emmanuel Maurice DO   4 mg at 11/13/19 1200   • pantoprazole (PROTONIX) injection 40 mg  40 mg Intravenous Once Wander Lemons MD       • Vasopressin (PITRESSIN) 20 Units in sodium chloride 0.9 % 100 mL (0.2 Units/mL) infusion  0.02-0.1 Units/min Intravenous Continuous PRN Scarlett Galeas MD   Stopped at 11/14/19 0331       Assessment and Plan:      Chest pain    Elevated troponin    Cardiogenic shock (CMS/HCC)    Ventricular tachycardia (paroxysmal) (CMS/HCC)  1.  Nonischemic cardiomyopathy with severe left ventricular systolic dysfunction with an ejection fraction of less than 20%.  Patient underwent an AICD placement and subsequently had persistent low blood pressure  requiring pressors requiring intra-aortic balloon pump insertion.  Patient has worsening of the renal function.  Would continue with the vasopressin.  Patient renal function has worsened with a creatinine of 1.69.  Would continue with supportive care.  Patient would be continued on dobutamine.  Have discussed with the family possible need for transplant to [facility].    2.  Atherosclerotic coronary artery disease.  Patient had nonobstructive disease in the left anterior descending and circumflex artery.  Patient had right coronary artery stenosis which could not explain patient LV dysfunction.  Patient had not complained of having symptoms of chest pain.      3.  Left ventricular systolic dysfunction with valvular insufficiency.  Would continue with the intra-aortic balloon pump support.  Patient would eventually need carvedilol and afterload reduction.    4.  Renal insufficiency.  Patient would benefit from nephrology evaluation.    The above plan of management were discussed at length with the patient and the family.    Dr. Galeas to cover for cardiology over the weekend in my absence.          Cheikh Lopez MD  11/14/19  10:29 AM      Time: Time spent on face-to-face interaction 30 minutes    Dictated utilizing Dragon dictation.

## 2019-11-15 NOTE — PLAN OF CARE
Problem: Patient Care Overview  Goal: Plan of Care Review  Outcome: Ongoing (interventions implemented as appropriate)   11/15/19 0225   Coping/Psychosocial   Plan of Care Reviewed With family   Plan of Care Review   Progress improving   OTHER   Outcome Summary pt tolerating current balloon pump rate of 1:3, pt has had intermittent runs of VTach overnight, pt responding well to pain med regime, vss, will continue to monitor       Problem: Pain, Chronic (Adult)  Goal: Identify Related Risk Factors and Signs and Symptoms  Outcome: Ongoing (interventions implemented as appropriate)    Goal: Acceptable Pain/Comfort Level and Functional Ability  Outcome: Ongoing (interventions implemented as appropriate)      Problem: Cardiac: ACS (Acute Coronary Syndrome) (Adult)  Goal: Signs and Symptoms of Listed Potential Problems Will be Absent, Minimized or Managed (Cardiac: ACS)  Outcome: Ongoing (interventions implemented as appropriate)      Problem: Cardiac: Heart Failure (Adult)  Goal: Signs and Symptoms of Listed Potential Problems Will be Absent, Minimized or Managed (Cardiac: Heart Failure)  Outcome: Ongoing (interventions implemented as appropriate)      Problem: Ventilation, Mechanical Invasive (Adult)  Goal: Signs and Symptoms of Listed Potential Problems Will be Absent, Minimized or Managed (Ventilation, Mechanical Invasive)  Outcome: Ongoing (interventions implemented as appropriate)      Problem: Skin Injury Risk (Adult)  Goal: Identify Related Risk Factors and Signs and Symptoms  Outcome: Ongoing (interventions implemented as appropriate)    Goal: Skin Health and Integrity  Outcome: Ongoing (interventions implemented as appropriate)      Problem: Fall Risk (Adult)  Goal: Identify Related Risk Factors and Signs and Symptoms  Outcome: Ongoing (interventions implemented as appropriate)    Goal: Absence of Fall  Outcome: Ongoing (interventions implemented as appropriate)      Problem: Intra-Aortic Balloon Pump  (Adult)  Goal: Signs and Symptoms of Listed Potential Problems Will be Absent, Minimized or Managed (Intra-Aortic Balloon Pump)  Outcome: Ongoing (interventions implemented as appropriate)      Problem: Restraint, Nonbehavioral (Nonviolent)  Goal: Rationale and Justification  Outcome: Ongoing (interventions implemented as appropriate)    Goal: Nonbehavioral (Nonviolent) Restraint: Absence of Injury/Harm  Outcome: Ongoing (interventions implemented as appropriate)    Goal: Nonbehavioral (Nonviolent) Restraint: Achievement of Discontinuation Criteria  Outcome: Ongoing (interventions implemented as appropriate)    Goal: Nonbehavioral (Nonviolent) Restraint: Preservation of Dignity and Wellbeing  Outcome: Ongoing (interventions implemented as appropriate)      Problem: Renal Failure/Kidney Injury, Acute (Adult)  Goal: Signs and Symptoms of Listed Potential Problems Will be Absent, Minimized or Managed (Renal Failure/Kidney Injury, Acute)  Outcome: Ongoing (interventions implemented as appropriate)

## 2019-11-15 NOTE — PLAN OF CARE
Problem: Patient Care Overview  Goal: Plan of Care Review  Outcome: Ongoing (interventions implemented as appropriate)   11/15/19 1107   Coping/Psychosocial   Plan of Care Reviewed With caregiver   Plan of Care Review   Progress no change   OTHER   Outcome Summary Tube feeding intitated at 10 cc/hr increased by 10 cc q 10 hrs. Tube feeding is Peptamen 1.5 with goal rate of 50cc/hr. Monitoring for refeeding syndrome. RD following.

## 2019-11-15 NOTE — PLAN OF CARE
Problem: Ventilation, Mechanical Invasive (Adult)  Intervention: Prevent Airway Displacement/Mechanical Dysfunction   11/15/19 0314   Prevent Airway Displacement/Mechanical Dysfunction   Airway Safety Measures mask at bedside;suction at bedside     Intervention: Prevent Ventilator-Induced Lung Injury   11/15/19 0314   Respiratory Interventions   Lung Protection Measures plateau/inspiratory pressure monitored       Goal: Signs and Symptoms of Listed Potential Problems Will be Absent, Minimized or Managed (Ventilation, Mechanical Invasive)  Outcome: Ongoing (interventions implemented as appropriate)   11/15/19 0314   Goal/Outcome Evaluation   Problems Assessed (Mechanical Ventilation, Invasive) inability to wean   Problems Present (Mech Vent, Invasive) inability to wean  (Pt currently on ativan drip and to unstable for SBT.)

## 2019-11-15 NOTE — PROGRESS NOTES
[facility] Cardiology  INPATIENT PROGRESS NOTE    Name: Matthew Danielle  Age/Sex: 54 y.o. male  :  1965        PCP: Provider, No Known    Vital Signs  Temp:  [98.8 °F (37.1 °C)-99.5 °F (37.5 °C)] 99.5 °F (37.5 °C)  Heart Rate:  [81-91] 83  Resp:  [16-19] 19  BP: ()/(62-80) 89/64  Arterial Line BP: ()/(56-77) 94/56  FiO2 (%):  [30 %-50 %] 30 %  Body mass index is 22.92 kg/m² (pended).     Subjective   Intubated,sedated  Patient Active Problem List   Diagnosis   • Elevated troponin   • Chest pain   • Cardiogenic shock (CMS/HCC)   • Ventricular tachycardia (paroxysmal) (CMS/HCC)       History reviewed. No pertinent past medical history.    Current Facility-Administered Medications   Medication Dose Route Frequency Provider Last Rate Last Dose   • amiodarone (NEXTERONE) 360 mg/200 mL (1.8 mg/mL) infusion  0.5 mg/min Intravenous Continuous Alisia Burrell APRN 16.67 mL/hr at 11/15/19 0334 0.5 mg/min at 11/15/19 0334   • aspirin EC tablet 81 mg  81 mg Oral Daily Cheikh Lopez MD       • atorvastatin (LIPITOR) tablet 10 mg  10 mg Oral Nightly Elysia Sanchez MD   10 mg at 19   • DOBUTamine (DOBUTREX) 1 mg/mL infusion  8 mcg/kg/min Intravenous Titrated Elysia Sanchez MD 10.82 mL/hr at 11/15/19 0517 2.5 mcg/kg/min at 11/15/19 0517   • EPINEPHrine (ADRENALIN) 5 mg in sodium chloride 0.9 % 250 mL (0.02 mg/mL) infusion  0.02-0.5 mcg/kg/min Intravenous Titrated Wander Lemons MD   Stopped at 19 0306   • fentaNYL citrate (PF) (SUBLIMAZE) injection 25 mcg  25 mcg Intravenous Q1H PRN Alisia Burrell APRN   25 mcg at 11/15/19 0704   • heparin 66245 units/250 mL (100 units/mL) in 0.45 % NaCl infusion  17 Units/kg/hr Intravenous Titrated Scarlett Galeas MD 12.25 mL/hr at 19 17 Units/kg/hr at 19    And   • heparin (porcine) 5000 UNIT/ML injection 4,000 Units  4,000 Units Intravenous PRN Scarlett Galeas MD        And   • heparin  (porcine) 5000 UNIT/ML injection 2,200 Units  30 Units/kg Intravenous PRN Scarlett Galeas MD   2,200 Units at 11/14/19 1924   • Influenza Vac Subunit Quad (FLUCELVAX) injection 0.5 mL  0.5 mL Intramuscular During Hospitalization Emmanuel Maurice DO       • LORazepam (ATIVAN) 100 mg in sodium chloride 0.9 % 100 mL (1 mg/mL) infusion  1 mg/hr Intravenous Titrated Scarlett Galeas MD 6 mL/hr at 11/14/19 2018 6 mg/hr at 11/14/19 2018   • magnesium oxide (MAGOX) tablet 400 mg  400 mg Oral BID Cheikh Lopez MD   400 mg at 11/14/19 2038   • midazolam (VERSED) injection 2 mg  2 mg Intravenous Q2H PRN Alisia Burrell APRN   2 mg at 11/14/19 1828   • nicotine (NICODERM CQ) 21 MG/24HR patch 1 patch  1 patch Transdermal Q24H Wander Lemons MD   1 patch at 11/14/19 0814   • norepinephrine (LEVOPHED) 8 mg/250 mL (32 mcg/mL) in sodium chloride 0.9% infusion (premix)  0.02-0.3 mcg/kg/min Intravenous Titrated Wander Lemons MD   Stopped at 11/14/19 0100   • ondansetron (ZOFRAN) injection 4 mg  4 mg Intravenous Q6H PRN Emmanuel Maurice DO   4 mg at 11/13/19 1200   • pantoprazole (PROTONIX) injection 40 mg  40 mg Intravenous Once Wander Lemons MD       • sodium chloride 0.9 % flush 10 mL  10 mL Intravenous Q12H Scarlett Galeas MD   10 mL at 11/14/19 2040   • sodium chloride 0.9 % flush 10 mL  10 mL Intravenous Q12H Scarlett Galeas MD   10 mL at 11/14/19 2039   • sodium chloride 0.9 % flush 10 mL  10 mL Intravenous Q12H Scarlett Galeas MD   10 mL at 11/14/19 2039   • sodium chloride 0.9 % flush 10 mL  10 mL Intravenous PRN Scarlett Galeas MD       • sodium chloride 0.9 % flush 20 mL  20 mL Intravenous PRN Scarlett Galeas MD       • Vasopressin (PITRESSIN) 20 Units in sodium chloride 0.9 % 100 mL (0.2 Units/mL) infusion  0.02-0.1 Units/min Intravenous Continuous PRScarlett Neely MD   Stopped at 11/14/19 0331       Past Surgical History:   Procedure Laterality Date   •  CARDIAC CATHETERIZATION N/A 11/12/2019    Procedure: Left Heart Cath;  Surgeon: Cheikh Lopez MD;  Location: [facility] CATH INVASIVE LOCATION;  Service: Cardiology   • CARDIAC CATHETERIZATION Right 11/13/2019    Procedure: Intra-Aortic Baloon Pump Insertion;  Surgeon: Elysia Sanchez MD;  Location: [facility] CATH INVASIVE LOCATION;  Service: Cardiology   • CARDIAC ELECTROPHYSIOLOGY PROCEDURE N/A 11/13/2019    Procedure: Biventricular Device Insertion;  Surgeon: Elysia Sanchez MD;  Location: [facility] CATH INVASIVE LOCATION;  Service: Cardiology   • HERNIA REPAIR Bilateral     inguinal   • INCISION AND DRAINAGE ABSCESS Right 08/31/2019    right thumb joint   • JOINT REPLACEMENT Left 1988    knee       Social History     Socioeconomic History   • Marital status: Single     Spouse name: Not on file   • Number of children: Not on file   • Years of education: Not on file   • Highest education level: Not on file   Tobacco Use   • Smoking status: Current Every Day Smoker     Packs/day: 1.00     Years: 39.00     Pack years: 39.00     Types: Cigarettes   • Smokeless tobacco: Never Used   Substance and Sexual Activity   • Alcohol use: No     Frequency: Never   • Drug use: Yes     Frequency: 3.0 times per week     Types: Marijuana   • Sexual activity: Yes     Partners: Female     Birth control/protection: None       O/E    Neck: Supple.  No JVD, no thyroid enlargement.  Chest: Air entry equal, normal respiration.  No rhonchi or creps.  Cardiovascular system:  Regular rate and rhythm, no murmurs.  Abdomen: Soft, no tenderness, bowel sounds present, no hepatosplenomegaly.  CNS: Alert, oriented to place and time.  No motor or sensory deficit.  Cranial nerves intact.  Musculoskeletal: No deformity of the back or spine.  Extremities:  No edema.  Pulses equal on both sides.    Lab Results (last 24 hours)     Procedure Component Value Units Date/Time    aPTT [[id number]]  (Abnormal) Collected:  11/14/19 1301    Specimen:  Blood Updated:   11/14/19 1332     PTT 74.0 seconds     Narrative:       The recommended Heparin therapeutic range is 68-97 seconds.    Respiratory Culture - Sputum, ET Suction [[id number]] Collected:  11/14/19 1315    Specimen:  Sputum from ET Suction Updated:  11/14/19 1352     Gram Stain Many (4+) WBCs seen      No epithelial cells seen      Mixed bacterial minor    Basic Metabolic Panel [[id number]]  (Abnormal) Collected:  11/14/19 1624    Specimen:  Blood Updated:  11/14/19 1647     Glucose 118 mg/dL      BUN 37 mg/dL      Creatinine 1.53 mg/dL      Sodium 138 mmol/L      Potassium 4.1 mmol/L      Chloride 97 mmol/L      CO2 33.0 mmol/L      Calcium 8.3 mg/dL      eGFR Non African Amer 48 mL/min/1.73      BUN/Creatinine Ratio 24.2     Anion Gap 8.0 mmol/L     Narrative:       GFR Normal >60  Chronic Kidney Disease <60  Kidney Failure <15    aPTT [[id number]]  (Abnormal) Collected:  11/14/19 1843    Specimen:  Blood Updated:  11/14/19 1906     PTT 58.1 seconds     Narrative:       The recommended Heparin therapeutic range is 68-97 seconds.    aPTT [[id number]]  (Abnormal) Collected:  11/15/19 0123    Specimen:  Blood Updated:  11/15/19 0156     PTT 66.9 seconds     Narrative:       The recommended Heparin therapeutic range is 68-97 seconds.    Blood Gas, Arterial [[id number]]  (Abnormal) Collected:  11/15/19 0503    Specimen:  Arterial Blood Updated:  11/15/19 0515     Site Arterial Line     Juan's Test N/A     pH, Arterial 7.450 pH units      Comment: 83 Value above reference range        pCO2, Arterial 48.1 mm Hg      Comment: 83 Value above reference range        pO2, Arterial 116.0 mm Hg      Comment: 83 Value above reference range        HCO3, Arterial 33.5 mmol/L      Comment: 83 Value above reference range        Base Excess, Arterial 8.1 mmol/L      Comment: 83 Value above reference range        O2 Saturation, Arterial 98.9 %      Barometric Pressure for Blood Gas 757 mmHg      Modality Ventilator     FIO2 40 %       Ventilator Mode AC     Set Tidal Volume 550     PEEP 5.0     Collected by TS     Comment: Meter: [id number]     :  [id number]       CBC & Differential [[id number]] Collected:  11/15/19 0546    Specimen:  Blood Updated:  11/15/19 0605    Narrative:       The following orders were created for panel order CBC & Differential.  Procedure                               Abnormality         Status                     ---------                               -----------         ------                     CBC Auto Differential[[id number]]        Abnormal            Final result                 Please view results for these tests on the individual orders.    Basic Metabolic Panel [[id number]]  (Abnormal) Collected:  11/15/19 0546    Specimen:  Blood Updated:  11/15/19 0626     Glucose 118 mg/dL      BUN 40 mg/dL      Creatinine 1.61 mg/dL      Sodium 139 mmol/L      Potassium 4.0 mmol/L      Chloride 98 mmol/L      CO2 32.0 mmol/L      Calcium 8.4 mg/dL      eGFR Non African Amer 45 mL/min/1.73      BUN/Creatinine Ratio 24.8     Anion Gap 9.0 mmol/L     Narrative:       GFR Normal >60  Chronic Kidney Disease <60  Kidney Failure <15    CBC Auto Differential [[id number]]  (Abnormal) Collected:  11/15/19 0546    Specimen:  Blood Updated:  11/15/19 0605     WBC 12.39 10*3/mm3      RBC 4.10 10*6/mm3      Hemoglobin 12.9 g/dL      Hematocrit 38.7 %      MCV 94.4 fL      MCH 31.5 pg      MCHC 33.3 g/dL      RDW 15.6 %      RDW-SD 53.6 fl      MPV 11.0 fL      Platelets 119 10*3/mm3      Neutrophil % 82.8 %      Lymphocyte % 8.2 %      Monocyte % 8.2 %      Eosinophil % 0.0 %      Basophil % 0.2 %      Immature Grans % 0.6 %      Neutrophils, Absolute 10.28 10*3/mm3      Lymphocytes, Absolute 1.01 10*3/mm3      Monocytes, Absolute 1.01 10*3/mm3      Eosinophils, Absolute 0.00 10*3/mm3      Basophils, Absolute 0.02 10*3/mm3      Immature Grans, Absolute 0.07 10*3/mm3      nRBC 0.0 /100 WBC     aPTT [[id number]] Collected:   11/15/19 0809    Specimen:  Blood Updated:  11/15/19 0814          A/P  Cardiogenic shock post ICD-    .    IABP successfully removed.        This document has been electronically signed by Scarlett Galeas MD on November 15, 2019 8:17 AM         Part of this note may be an electronic transcription/translation of spoken language to printed text using the Dragon Dictation System.

## 2019-11-15 NOTE — PROGRESS NOTES
MEDICINE DAILY PROGRESS NOTE  NAME: Matthew Danielle  : 1965  MRN: [id number]     LOS: 2 days     PROVIDER OF SERVICE: David Moreland MD    Chief Complaint: Chest pain    Subjective:   HPI: Patient went for AICD placement yesterday and had balloon pump placed prior to procedure.  Patient developed hypotension post procedure and was started on vasoactive agents.  Patient was placed in the intensive care unit.  Patient also intubated post procedure.    Interval History:  History taken from: chart RN  11/15. Patient remained intubated/sedated with balloon pump in place.  . Patient intubated/sedate post AICD placement and balloon pump. Patient weaning off pressor, balloon pump, and ventilator support.    F - NPO. TF's today.  A - Fentanyl  S - Ativan gtt. Versed.  T - Heparin gtt.  H - Flat at time of exam.  U - Protonix  G - N/A.    FiO2 (%):  [30 %-50 %] 30 %  S RR:  [16] 16  PEEP/CPAP (cm H2O):  [5 cm H20] 5 cm H20  CT SUP:  [0 cm H20] 0 cm H20  MAP (cm H2O):  [9.6-11] 11      Intake/Output       19 0701 - 19 0700 19 07 - 11/15/19 0700 11/15/19 0701 - 19 0700      0488-1510 2152-0888 Total 4123-9783 6087-4366 Total 0470-5424 0378-6815 Total       Intake    I.V.  1733.3  1124 2857.3  606  502 1108  --  -- --    IV Piggyback  --  250 250  --  -- --  --  -- --    Total Intake 1733.3 1374 3107.3  -- -- --       Output    Urine  405  715 1120  600  735 1335  90  -- 90    Emesis/NG output  --  -- --  --  100 100  --  -- --    Total Output   90 -- 90        Intake/Output       19 0701 - 19 0700 19 0701 - 11/15/19 0700 11/15/19 0701 - 19 0700      6078-3141 4419-3477 Total 6019-69801900 Total  Total       Intake    I.V.  1733.3  1124 2857.3  606  502 1108  --  -- --    IV Piggyback  --  250 250  --  -- --  --  -- --    Total Intake 1733.3 1374 3107.3  -- -- --       Output    Urine  405   715 1120  600  735 1335  90  -- 90    Emesis/NG output  --  -- --  --  100 100  --  -- --    Total Output   90 -- 90          Intake/Output       11/13/19 0701 - 11/14/19 0700 11/14/19 0701 - 11/15/19 0700 11/15/19 0701 - 11/16/19 0700      5249-5383 3893-8779 Total 5006-7721 7795-7489 Total 5947-4256 6508-9303 Total       Intake    I.V.  1733.3  1124 2857.3  606  502 1108  --  -- --    IV Piggyback  --  250 250  --  -- --  --  -- --    Total Intake 1733.3 1374 3107.3  -- -- --       Output    Urine  405  715 1120  600  735 1335  90  -- 90    Emesis/NG output  --  -- --  --  100 100  --  -- --    Total Output   90 -- 90          Review of Systems:   Review of Systems   Unable to perform ROS: Intubated       Objective:     Vital Signs  Vitals:    11/15/19 0800 11/15/19 0900 11/15/19 0921 11/15/19 1000   BP: (!) 89/64 93/66  98/72   BP Location: Left arm Left arm  Left arm   Patient Position: Lying Lying  Lying   Pulse: 83 84 84 86   Resp:       Temp: 99.5 °F (37.5 °C)      TempSrc: Oral      SpO2: 96% 95% 96% 96%   Weight:       Height:           Physical Exam  Physical Exam   Constitutional: He appears well-developed and well-nourished. No distress.   HENT:   Head: Normocephalic and atraumatic.   Right Ear: External ear normal.   Left Ear: External ear normal.   Nose: Nose normal.   Eyes: Conjunctivae are normal. Right eye exhibits no discharge. Left eye exhibits no discharge.   Neck: Neck supple. No thyromegaly present.   Cardiovascular: Normal rate and regular rhythm.   Pulmonary/Chest: Effort normal. He has no wheezes. He has no rales. He exhibits no tenderness.   ETT in place. OGT in place. Right IJ in place.   Abdominal: Soft. Bowel sounds are normal. He exhibits no distension.   Musculoskeletal: Normal range of motion. He exhibits no edema.   Neurological:   Unable to assess. Intubated/sedated.   Skin: Skin is warm and dry. No rash noted. He is not  diaphoretic. No erythema. No pallor.   Psychiatric:   Unable to assess. Intubated/sedated.   Nursing note and vitals reviewed.      Medication Review    Current Facility-Administered Medications:   •  [COMPLETED] amiodarone in dextrose 5% (NEXTERONE) loading dose 150mg/100mL, 150 mg, Intravenous, Once, Stopped at 19 0926 **FOLLOWED BY** [] amiodarone (NEXTERONE) 360 mg/200 mL (1.8 mg/mL) infusion, 1 mg/min, Intravenous, Continuous, Stopped at 19 1526 **FOLLOWED BY** amiodarone (NEXTERONE) 360 mg/200 mL (1.8 mg/mL) infusion, 0.5 mg/min, Intravenous, Continuous, Alisia Burrell APRN, Last Rate: 16.67 mL/hr at 11/15/19 0334, 0.5 mg/min at 11/15/19 0334  •  aspirin EC tablet 81 mg, 81 mg, Oral, Daily, Cheikh Lopez MD  •  atorvastatin (LIPITOR) tablet 10 mg, 10 mg, Oral, Nightly, Elysia Sanchez MD, 10 mg at 197  •  DOBUTamine (DOBUTREX) 1 mg/mL infusion, 8 mcg/kg/min, Intravenous, Titrated, Elysia Sanchez MD, Last Rate: 10.82 mL/hr at 11/15/19 0517, 2.5 mcg/kg/min at 11/15/19 0517  •  EPINEPHrine (ADRENALIN) 5 mg in sodium chloride 0.9 % 250 mL (0.02 mg/mL) infusion, 0.02-0.5 mcg/kg/min, Intravenous, Titrated, Wander Lemons MD, Stopped at 19 0306  •  fentaNYL citrate (PF) (SUBLIMAZE) injection 25 mcg, 25 mcg, Intravenous, Q1H PRN, Alisia Burrell APRN, 25 mcg at 11/15/19 0704  •  heparin 12539 units/250 mL (100 units/mL) in 0.45 % NaCl infusion, 19 Units/kg/hr, Intravenous, Titrated, Last Rate: 13.69 mL/hr at 11/15/19 0845, 19 Units/kg/hr at 11/15/19 0845 **AND** heparin (porcine) 5000 UNIT/ML injection 4,000 Units, 4,000 Units, Intravenous, PRN **AND** heparin (porcine) 5000 UNIT/ML injection 2,200 Units, 30 Units/kg, Intravenous, PRN, Scarlett Galeas MD, 2,200 Units at 11/15/19 0840  •  Influenza Vac Subunit Quad (FLUCELVAX) injection 0.5 mL, 0.5 mL, Intramuscular, During Hospitalization, Emmanuel Maurice,   •  LORazepam (ATIVAN) 100 mg in sodium chloride  0.9 % 100 mL (1 mg/mL) infusion, 1 mg/hr, Intravenous, Titrated, Scarlett Galeas MD, Last Rate: 6 mL/hr at 11/14/19 2018, 6 mg/hr at 11/14/19 2018  •  magnesium oxide (MAGOX) tablet 400 mg, 400 mg, Oral, BID, Cheikh Lopez MD, 400 mg at 11/14/19 2038  •  midazolam (VERSED) injection 2 mg, 2 mg, Intravenous, Q2H PRN, Alisia Burrell, APRN, 2 mg at 11/14/19 1828  •  nicotine (NICODERM CQ) 21 MG/24HR patch 1 patch, 1 patch, Transdermal, Q24H, Wander Lemons MD, 1 patch at 11/15/19 0913  •  norepinephrine (LEVOPHED) 8 mg/250 mL (32 mcg/mL) in sodium chloride 0.9% infusion (premix), 0.02-0.3 mcg/kg/min, Intravenous, Titrated, Wander Lemons MD, Stopped at 11/14/19 0100  •  ondansetron (ZOFRAN) injection 4 mg, 4 mg, Intravenous, Q6H PRN, Emmanuel Maurice DO, 4 mg at 11/13/19 1200  •  pantoprazole (PROTONIX) injection 40 mg, 40 mg, Intravenous, Once, Wander Lemons MD  •  sodium chloride 0.9 % flush 10 mL, 10 mL, Intravenous, Q12H, Scarlett Galeas MD, 10 mL at 11/14/19 2040  •  sodium chloride 0.9 % flush 10 mL, 10 mL, Intravenous, Q12H, Scarlett Galeas MD, 10 mL at 11/15/19 0908  •  sodium chloride 0.9 % flush 10 mL, 10 mL, Intravenous, Q12H, Scarlett Galeas MD, 10 mL at 11/15/19 0908  •  sodium chloride 0.9 % flush 10 mL, 10 mL, Intravenous, PRN, Scarlett Galeas MD  •  sodium chloride 0.9 % flush 20 mL, 20 mL, Intravenous, PRN, Scarlett Galeas MD  •  Vasopressin (PITRESSIN) 20 Units in sodium chloride 0.9 % 100 mL (0.2 Units/mL) infusion, 0.02-0.1 Units/min, Intravenous, Continuous PRN, Scarlett Galeas MD, Stopped at 11/14/19 0331     Diagnostic Data    Lab Results (last 24 hours)     Procedure Component Value Units Date/Time    aPTT [[id number]]  (Abnormal) Collected:  11/15/19 0809    Specimen:  Blood Updated:  11/15/19 0833     PTT 53.2 seconds     Narrative:       The recommended Heparin therapeutic range is 68-97 seconds.    Basic Metabolic Panel [[id number]]   (Abnormal) Collected:  11/15/19 0546    Specimen:  Blood Updated:  11/15/19 0626     Glucose 118 mg/dL      BUN 40 mg/dL      Creatinine 1.61 mg/dL      Sodium 139 mmol/L      Potassium 4.0 mmol/L      Chloride 98 mmol/L      CO2 32.0 mmol/L      Calcium 8.4 mg/dL      eGFR Non African Amer 45 mL/min/1.73      BUN/Creatinine Ratio 24.8     Anion Gap 9.0 mmol/L     Narrative:       GFR Normal >60  Chronic Kidney Disease <60  Kidney Failure <15    CBC & Differential [[id number]] Collected:  11/15/19 0546    Specimen:  Blood Updated:  11/15/19 0605    Narrative:       The following orders were created for panel order CBC & Differential.  Procedure                               Abnormality         Status                     ---------                               -----------         ------                     CBC Auto Differential[[id number]]        Abnormal            Final result                 Please view results for these tests on the individual orders.    CBC Auto Differential [[id number]]  (Abnormal) Collected:  11/15/19 0546    Specimen:  Blood Updated:  11/15/19 0605     WBC 12.39 10*3/mm3      RBC 4.10 10*6/mm3      Hemoglobin 12.9 g/dL      Hematocrit 38.7 %      MCV 94.4 fL      MCH 31.5 pg      MCHC 33.3 g/dL      RDW 15.6 %      RDW-SD 53.6 fl      MPV 11.0 fL      Platelets 119 10*3/mm3      Neutrophil % 82.8 %      Lymphocyte % 8.2 %      Monocyte % 8.2 %      Eosinophil % 0.0 %      Basophil % 0.2 %      Immature Grans % 0.6 %      Neutrophils, Absolute 10.28 10*3/mm3      Lymphocytes, Absolute 1.01 10*3/mm3      Monocytes, Absolute 1.01 10*3/mm3      Eosinophils, Absolute 0.00 10*3/mm3      Basophils, Absolute 0.02 10*3/mm3      Immature Grans, Absolute 0.07 10*3/mm3      nRBC 0.0 /100 WBC     Blood Gas, Arterial [[id number]]  (Abnormal) Collected:  11/15/19 0503    Specimen:  Arterial Blood Updated:  11/15/19 0515     Site Arterial Line     Juan's Test N/A     pH, Arterial 7.450 pH units      Comment: 83  Value above reference range        pCO2, Arterial 48.1 mm Hg      Comment: 83 Value above reference range        pO2, Arterial 116.0 mm Hg      Comment: 83 Value above reference range        HCO3, Arterial 33.5 mmol/L      Comment: 83 Value above reference range        Base Excess, Arterial 8.1 mmol/L      Comment: 83 Value above reference range        O2 Saturation, Arterial 98.9 %      Barometric Pressure for Blood Gas 757 mmHg      Modality Ventilator     FIO2 40 %      Ventilator Mode AC     Set Tidal Volume 550     PEEP 5.0     Collected by TS     Comment: Meter: [id number]     :  [id number]       aPTT [[id number]]  (Abnormal) Collected:  11/15/19 0123    Specimen:  Blood Updated:  11/15/19 0156     PTT 66.9 seconds     Narrative:       The recommended Heparin therapeutic range is 68-97 seconds.    aPTT [[id number]]  (Abnormal) Collected:  11/14/19 1843    Specimen:  Blood Updated:  11/14/19 1906     PTT 58.1 seconds     Narrative:       The recommended Heparin therapeutic range is 68-97 seconds.    Basic Metabolic Panel [[id number]]  (Abnormal) Collected:  11/14/19 1624    Specimen:  Blood Updated:  11/14/19 1647     Glucose 118 mg/dL      BUN 37 mg/dL      Creatinine 1.53 mg/dL      Sodium 138 mmol/L      Potassium 4.1 mmol/L      Chloride 97 mmol/L      CO2 33.0 mmol/L      Calcium 8.3 mg/dL      eGFR Non African Amer 48 mL/min/1.73      BUN/Creatinine Ratio 24.2     Anion Gap 8.0 mmol/L     Narrative:       GFR Normal >60  Chronic Kidney Disease <60  Kidney Failure <15    Respiratory Culture - Sputum, ET Suction [[id number]] Collected:  11/14/19 1315    Specimen:  Sputum from ET Suction Updated:  11/14/19 1352     Gram Stain Many (4+) WBCs seen      No epithelial cells seen      Mixed bacterial minor    aPTT [[id number]]  (Abnormal) Collected:  11/14/19 1301    Specimen:  Blood Updated:  11/14/19 1332     PTT 74.0 seconds     Narrative:       The recommended Heparin therapeutic range is 68-97  seconds.    Scan Slide [[id number]] Collected:  11/14/19 0557    Specimen:  Blood Updated:  11/14/19 1037     Target Cells Slight/1+     WBC Morphology Normal     Platelet Estimate Decreased          I reviewed the patient's new clinical results.    Assessment/Plan:     Active Hospital Problems    Diagnosis POA   • **Chest pain [R07.9] Unknown     Added automatically from request for surgery [id number]     • Cardiogenic shock (CMS/HCC) [R57.0] Unknown   • Ventricular tachycardia (paroxysmal) (CMS/HCC) [I47.2] Unknown   • Elevated troponin [R79.89] Yes       #. Cardiogenic shock. Cards following. AICD. Balloon pump. Dobutamine. Lasix.  #. Vtach. Paroxysmal.   11/16. Amio gtt.  11/15. AICD discharge overnight. Amio gtt.  #. Post op respiratory failure. Intubated/sedated.   11/16. Pulm consulted for vent management.  11/15. Will consult pulmonology for vent management.  #. NAYELY on CKD III.   11/16. Stable.   11/15. Improving towards baseline. Monitor. Consult nephrology as needed.  Results from last 7 days   Lab Units 11/15/19  0546 11/14/19  1624 11/14/19  0557 11/13/19  1518 11/13/19  0710 11/12/19  0559 11/11/19  0530 11/10/19  1219   CREATININE mg/dL 1.61* 1.53* 1.68* 1.79* 1.36* 1.15 1.06 1.10   #. NICM w/ severe LV dysfunction. EF less then 20%.   Results for orders placed during the hospital encounter of 11/10/19   Adult Transthoracic Echo Limited W/ Cont if Necessary Per Protocol    Narrative · The left ventricular cavity is moderately dilated.  · Right ventricular cavity is moderately dilated.  · Mildly reduced right ventricular systolic function noted.  · Moderate-to-severe mitral valve regurgitation is present  · Moderate tricuspid valve regurgitation is present.  · There is no evidence of pericardial effusion.      #. Polysubstance abuse. Cessation counseling when able.  #. ICM.   #. Nutrition. TF's today.      Will monitor patient's hospital course and adjust treatment as hospital course dictates.    DVT  prophylaxis: Heparin gtt  Code status is   Code Status and Medical Interventions:   Ordered at: 11/10/19 1206     Level Of Support Discussed With:    Patient     Code Status:    CPR     Medical Interventions (Level of Support Prior to Arrest):    Full       Plan for disposition:Planning ongoing.      Time:           This document has been electronically signed by David Moreland MD on November 15, 2019 10:32 AM

## 2019-11-15 NOTE — PROGRESS NOTES
LOS: 2 days   Patient Care Team:  Provider, No Known as PCP - General    Chief Complaint: Status post defibrillator placement and intracardiac aortic balloon pump stable at the time of evaluation titrating the balloon pump to wean off currently 3-1 setting with systolic blood pressures 103.  No further ventricular arrhythmia noted on IV amiodarone.  Family present the room       Review of Systems:   ROS  Cannot be obtained as the patient is intubated  Objective     Current Facility-Administered Medications   Medication Dose Route Frequency Provider Last Rate Last Dose   • amiodarone (NEXTERONE) 360 mg/200 mL (1.8 mg/mL) infusion  0.5 mg/min Intravenous Continuous Alisia Burrell APRN 16.67 mL/hr at 11/15/19 0334 0.5 mg/min at 11/15/19 0334   • aspirin EC tablet 81 mg  81 mg Oral Daily Cheikh Lopez MD       • atorvastatin (LIPITOR) tablet 10 mg  10 mg Oral Nightly Elysia Sanchez MD   10 mg at 11/14/19 2037   • DOBUTamine (DOBUTREX) 1 mg/mL infusion  8 mcg/kg/min Intravenous Titrated Elysia Sanchez MD 10.82 mL/hr at 11/15/19 0517 2.5 mcg/kg/min at 11/15/19 0517   • EPINEPHrine (ADRENALIN) 5 mg in sodium chloride 0.9 % 250 mL (0.02 mg/mL) infusion  0.02-0.5 mcg/kg/min Intravenous Titrated Wander Lemons MD   Stopped at 11/14/19 0306   • fentaNYL citrate (PF) (SUBLIMAZE) injection 25 mcg  25 mcg Intravenous Q1H PRN Alisia Burrell APRN   25 mcg at 11/15/19 0704   • heparin 95638 units/250 mL (100 units/mL) in 0.45 % NaCl infusion  19 Units/kg/hr Intravenous Titrated Scarlett Galeas MD 13.69 mL/hr at 11/15/19 0845 19 Units/kg/hr at 11/15/19 0845    And   • heparin (porcine) 5000 UNIT/ML injection 4,000 Units  4,000 Units Intravenous PRN Scarlett Galeas MD        And   • heparin (porcine) 5000 UNIT/ML injection 2,200 Units  30 Units/kg Intravenous PRN Scarlett Galeas MD   2,200 Units at 11/15/19 0840   • Influenza Vac Subunit Quad (FLUCELVAX) injection 0.5 mL  0.5 mL Intramuscular During  Hospitalization Emmanuel Maurice DO       • LORazepam (ATIVAN) 100 mg in sodium chloride 0.9 % 100 mL (1 mg/mL) infusion  1 mg/hr Intravenous Titrated Scarlett Galeas MD 5.5 mL/hr at 11/15/19 1100 5.5 mg/hr at 11/15/19 1100   • magnesium oxide (MAGOX) tablet 400 mg  400 mg Oral BID Cheikh Lopez MD   400 mg at 11/14/19 2038   • midazolam (VERSED) injection 2 mg  2 mg Intravenous Q2H PRN Alisia Burrell APRN   2 mg at 11/14/19 1828   • nicotine (NICODERM CQ) 21 MG/24HR patch 1 patch  1 patch Transdermal Q24H Wander Lemons MD   1 patch at 11/15/19 0913   • norepinephrine (LEVOPHED) 8 mg/250 mL (32 mcg/mL) in sodium chloride 0.9% infusion (premix)  0.02-0.3 mcg/kg/min Intravenous Titrated Wander Lemons MD   Stopped at 11/14/19 0100   • ondansetron (ZOFRAN) injection 4 mg  4 mg Intravenous Q6H PRN Emmanuel Maurice DO   4 mg at 11/13/19 1200   • pantoprazole (PROTONIX) injection 40 mg  40 mg Intravenous Once Wander Lemons MD       • sodium chloride 0.9 % flush 10 mL  10 mL Intravenous Q12H Scarlett Galeas MD   10 mL at 11/14/19 2040   • sodium chloride 0.9 % flush 10 mL  10 mL Intravenous Q12H Scarlett Galeas MD   10 mL at 11/15/19 0908   • sodium chloride 0.9 % flush 10 mL  10 mL Intravenous Q12H Scarlett Galeas MD   10 mL at 11/15/19 0908   • sodium chloride 0.9 % flush 10 mL  10 mL Intravenous PRN Scarlett Galeas MD       • sodium chloride 0.9 % flush 20 mL  20 mL Intravenous PRN Scarlett Galeas MD       • Vasopressin (PITRESSIN) 20 Units in sodium chloride 0.9 % 100 mL (0.2 Units/mL) infusion  0.02-0.1 Units/min Intravenous Continuous PRN Scarlett Galeas MD   Stopped at 11/14/19 0331       Vital Sign Min/Max for last 24 hours  Temp  Min: 98.8 °F (37.1 °C)  Max: 99.5 °F (37.5 °C)   BP  Min: 88/62  Max: 104/75   Pulse  Min: 81  Max: 86   Resp  Min: 16  Max: 21   SpO2  Min: 95 %  Max: 100 %   No Data Recorded   Weight  Min: 66.4 kg (146 lb 6.2 oz)  Max: 66.4  "kg (146 lb 6.2 oz)     Flowsheet Rows      First Filed Value   Admission Height  170.2 cm (67\") Documented at 11/10/2019 1146   Admission Weight  74.6 kg (164 lb 6.4 oz) Documented at 11/10/2019 1146            Intake/Output Summary (Last 24 hours) at 11/15/2019 1120  Last data filed at 11/15/2019 1100  Gross per 24 hour   Intake 1107.99 ml   Output 1375 ml   Net -267.01 ml       Physical Exam:     General Appearance:   Remain intubated   Lungs:    Bilateral decreased air entry    Heart:    Regular rhythm and normal rate, normal S1 and S2, no            murmur, no gallop, no rub, no click   Chest Wall:    No abnormalities observed   Abdomen:     Normal bowel sounds, no masses, no organomegaly, soft        non-tender, non-distended, no guarding, no rebound                tenderness   Extremities:  No cyanosis or clubbing   Pulses:   Pulses palpable and equal bilaterally   Skin:   No bleeding, bruising or rash        Results Review:   I reviewed the patient's new clinical results.  Lab Results   Component Value Date    WBC 12.39 (H) 11/15/2019    HGB 12.9 (L) 11/15/2019    HCT 38.7 11/15/2019    MCV 94.4 11/15/2019     (L) 11/15/2019     Lab Results   Component Value Date    GLUCOSE 118 (H) 11/15/2019    BUN 40 (H) 11/15/2019    CREATININE 1.61 (H) 11/15/2019    EGFRIFNONA 45 (L) 11/15/2019    BCR 24.8 11/15/2019    CO2 32.0 (H) 11/15/2019    CALCIUM 8.4 (L) 11/15/2019    ALBUMIN 3.40 (L) 11/14/2019    AST 41 (H) 11/13/2019    ALT 35 11/13/2019     No results found for: TSH  Lab Results   Component Value Date    INR 1.98 (H) 11/13/2019    INR 1.28 (H) 11/12/2019    INR 1.3 11/10/2019    PROTIME 22.3 (H) 11/13/2019    PROTIME 15.8 (H) 11/12/2019    PROTIME 12.9 (H) 11/10/2019     Lab Results   Component Value Date    PTT 53.2 (H) 11/15/2019       EKG:     Telemetry:    Ejection Fraction  No results found for: EF    Echo EF Estimated  No results found for: ECHOEFEST      Present on Admission:  • Elevated " troponin    Assessment/Plan     #1 recurrent ventricular tachycardia status post ICD placement  #2 hypertension currently on dobutamine with intracardiac balloon pump placement  #3 hypotension with improving systolic blood pressure on inotropic  #4 ventricular tachycardia with appropriate detection defibrillation started on amiodarone    No further recurrence of ventricular tachycardia weaning of balloon pump and Dr. Galeas will follow over the weekend.  Clinical condition again discussed with the family.  We will continue IV amiodarone and eventually patient need ARB, Coreg and may be Aldactone  Elysia Sanchez MD  11/15/19  11:20 AM      Part of this note may be an electronic transcription/translation of spoken language to printed text using the Dragon Dictation system.

## 2019-11-15 NOTE — CONSULTS
Adult Nutrition  Assessment    Patient Name:  Matthew Danielle  YOB: 1965  MRN: [id number]  Admit Date:  11/10/2019    Assessment Date:  11/15/2019    Comments: Visited pt today in CCU to initiate tube feedings. Pt has been NPO and without nutrition support for several days and has lost 13 pounds in 2 days (pt was on Lasix). Tube feeding was started at 10 cc/hr of Peptamen 1.5 and increased by 10 cc q 10 hrs. Goal rate is 50 cc/hr. Pt will be monitored for signs of refeeding syndrome due to poor nutrition status on admit. Tube feeding provides 1800 kcal and 81.6 g protein at goal rate. RD staff following.    Reason for Assessment     Row Name 11/15/19 1034          Reason for Assessment    Reason For Assessment  follow-up protocol  (Pended)      Diagnosis  cardiac disease  (Pended)      Identified At Risk by Screening Criteria  reduced oral intake over the last month;unintentional loss of 10 lbs or more in the past 2 mos  (Pended)          Nutrition/Diet History     Row Name 11/15/19 1034          Nutrition/Diet History    Typical Food/Fluid Intake  Pt is now intubated on vent and currently receiving no nutrition  (Pended)      Functional Status  nonambulatory  (Pended)      Factors Affecting Nutritional Intake  anorexia  (Pended)          Anthropometrics     Row Name 11/15/19 0500          Anthropometrics    Weight  66.4 kg (146 lb 6.2 oz)         Labs/Tests/Procedures/Meds     Row Name 11/15/19 1037          Labs/Procedures/Meds    Lab Results Reviewed  reviewed, pertinent  (Pended)      Lab Results Comments  Glu 118H, BUN 40H, Cr 1.61H, Platelets 4.8L, Phos 4.8H, BNP 16,042H, Alb 3.40L, Ca 4.30L  (Pended)         Diagnostic Tests/Procedures    Diagnostic Test/Procedure Reviewed  reviewed, pertinent  (Pended)      Diagnostic Test/Procedures Comments  intubated on vent  (Pended)         Medications    Pertinent Medications Reviewed  reviewed, pertinent  (Pended)          Physical Findings     Row  Name 11/15/19 1038          Physical Findings    Overall Physical Appearance  shortness of breath;loss of muscle mass;on ventilator support  (Pended)      Gastrointestinal  feeding tube  (Pended)      Tubes  nasogastric tube  (Pended)      Oral/Mouth Cavity  poor dentition  (Pended)          Estimated/Assessed Needs     Row Name 11/15/19 1039          Protein Requirements    Est Protein Requirement Amount (gms/kg)  0.9 gm protein  (Pended)         Fluid Requirements    Estimated Fluid Requirement Method  RDA Method  (Pended)          Nutrition Prescription Ordered     Row Name 11/15/19 1039          Nutrition Prescription PO    Current PO Diet  NPO  (Pended)         Nutrition Prescription EN    Enteral Route  NG  (Pended)                  Electronically signed by:  Mame Mcleod  11/15/19 11:02 AM

## 2019-11-15 NOTE — PROGRESS NOTES
"Intensive Care Follow-up      LOS: 2 days     Mr. Matthew Danielle, 54 y.o. male is followed for: Ventilator management     CHIEF COMPLIANT: Shortness of breath    Subjective - Interval History     This 54-year-old gentleman has COPD heart disease with recurrent ventricular tachycardia.  He had implantable defibrillator and a balloon pump placed yesterday.  He is now off insert agents however remains on a mechanical ventilator assist control of 16 and intra-aortic balloon pump.    The patient's relevant past medical, surgical and social history were reviewed and updated in Epic as appropriate.     Objective   BP 95/70 (BP Location: Left arm, Patient Position: Lying)   Pulse 86   Temp 99.5 °F (37.5 °C) (Oral)   Resp 21   Ht (P) 170.2 cm (67.01\")   Wt 66.4 kg (146 lb 6.2 oz)   SpO2 97%   BMI (P) 22.92 kg/m²       Vent Settings: FiO2 (%):  [30 %-40 %] 30 %  S RR:  [16] 16  PEEP/CPAP (cm H2O):  [5 cm H20] 5 cm H20  SC SUP:  [0 cm H20] 0 cm H20  MAP (cm H2O):  [9.6-11] 10    Physical Exam: Sedated white male on a ventilator    General Appearance:       Head:    Normocephalic, without obvious abnormality, atraumatic   Eyes:            Lids and lashes normal, conjunctivae and sclerae normal, no   icterus, no pallor, corneas clear, PERRLA   Ears:    Ears appear intact with no abnormalities noted   Throat:   No oral lesions, no thrush, oral mucosa moist   Neck:   No adenopathy, supple, trachea midline, no thyromegaly, no   carotid bruit, no JVD   Back:     No kyphosis present, no scoliosis present, no skin lesions,      erythema or scars, no tenderness to percussion or                   palpation,   range of motion normal   Lungs:    Diminished breath sounds without wheeze    Heart:    Regular rhythm and normal rate, normal S1 and S2, no            murmur, no gallop, no rub, no click   Chest Wall:    No abnormalities observed   Abdomen:     Normal bowel sounds, no masses, no organomegaly, soft        non-tender, " non-distended, no guarding, no rebound                tenderness   Rectal:     Deferred   Extremities:   Moves all extremities well, no edema, no cyanosis, no             redness   Pulses:   Pulses palpable and equal bilaterally   Skin:   No bleeding, bruising or rash   Lymph nodes:   No palpable adenopathy   Neurologic:   Cranial nerves 2 - 12 grossly intact, sensation intact, DTR       present and equal bilaterally     LAB:    pH, Arterial   Date Value Ref Range Status   11/15/2019 7.450 7.350 - 7.450 pH units Final     Comment:     83 Value above reference range     pCO2, Arterial   Date Value Ref Range Status   11/15/2019 48.1 (H) 35.0 - 45.0 mm Hg Final     Comment:     83 Value above reference range     pO2, Arterial   Date Value Ref Range Status   11/15/2019 116.0 (H) 83.0 - 108.0 mm Hg Final     Comment:     83 Value above reference range     Lab Results   Component Value Date    WBC 12.39 (H) 11/15/2019    HGB 12.9 (L) 11/15/2019    HCT 38.7 11/15/2019    MCV 94.4 11/15/2019     (L) 11/15/2019     Lab Results   Component Value Date    GLUCOSE 118 (H) 11/15/2019    BUN 40 (H) 11/15/2019    CREATININE 1.61 (H) 11/15/2019    EGFRIFNONA 45 (L) 11/15/2019    BCR 24.8 11/15/2019    CO2 32.0 (H) 11/15/2019    CALCIUM 8.4 (L) 11/15/2019    ALBUMIN 3.40 (L) 11/14/2019    AST 41 (H) 11/13/2019    ALT 35 11/13/2019         RAD:   Imaging Results (Last 24 Hours)     ** No results found for the last 24 hours. **         Impression      Active Hospital Problems    Diagnosis   • **Chest pain     Added automatically from request for surgery [id number]     • Cardiogenic shock (CMS/HCC)   • Ventricular tachycardia (paroxysmal) (CMS/HCC)   • Elevated troponin            Plan        Assessment COPD, recurrent ventricular tachycardia, status post AICD and interatrial balloon pump    Plan we will begin ventilator weaning after the balloon pump is removed        This document has been produced with the assistance of Chuck  dictation  This document has been electronically signed by Pedro Bah MD on November 15, 2019 12:06 PM       I discussed the patient's findings and my recommendations with patient and nursing staff

## 2019-11-16 NOTE — PLAN OF CARE
Problem: Ventilation, Mechanical Invasive (Adult)  Intervention: Prevent Airway Displacement/Mechanical Dysfunction   11/16/19 0527   Prevent Airway Displacement/Mechanical Dysfunction   Airway Safety Measures manual resuscitator/mask/valve in room;suction at bedside     Intervention: Prevent Ventilator-Induced Lung Injury   11/16/19 0527   Respiratory Interventions   Lung Protection Measures low inspiratory pressure provided;lung compliance monitored;optimal PEEP applied;plateau/inspiratory pressure monitored;ventilator waveforms monitored     Intervention: Optimize Oxygenation/Ventilation   11/16/19 0527   Respiratory Interventions   Airway/Ventilation Management airway patency maintained       Goal: Signs and Symptoms of Listed Potential Problems Will be Absent, Minimized or Managed (Ventilation, Mechanical Invasive)  Outcome: Ongoing (interventions implemented as appropriate)   11/16/19 0527   Goal/Outcome Evaluation   Problems Assessed (Mechanical Ventilation, Invasive) all   Problems Present (Mech Vent, Invasive) inability to wean

## 2019-11-16 NOTE — PROGRESS NOTES
MEDICINE DAILY PROGRESS NOTE  NAME: Matthew Danielle  : 1965  MRN: [id number]     LOS: 3 days     PROVIDER OF SERVICE: David Moreland MD    Chief Complaint: Chest pain    Subjective:   HPI: Patient went for AICD placement yesterday and had balloon pump placed prior to procedure.  Patient developed hypotension post procedure and was started on vasoactive agents.  Patient was placed in the intensive care unit.  Patient also intubated post procedure.    Interval History:  History taken from: chart RN  . Intubated. Plan to extubate today per cards/pulm.  11/15. Patient remained intubated/sedated with balloon pump in place.  . Patient intubated/sedate post AICD placement and balloon pump. Patient weaning off pressor, balloon pump, and ventilator support.    F - TF's  A - Fentanyl  S - Ativan gtt. Versed.  T - Heparin gtt.  H - Yes  U - Protonix  G - N/A.    FiO2 (%):  [30 %] 30 %  S RR:  [16] 16  PEEP/CPAP (cm H2O):  [5 cm H20] 5 cm H20  FL SUP:  [0 cm H20-8 cm H20] 8 cm H20  MAP (cm H2O):  [7.9-13] 9.1      Intake/Output       19 - 11/15/19 0700 11/15/19 0701 - 19 07      4064-7867 9234-3746 Total 1974-5265 0048-2131 Total       Intake    I.V.  606  502 1108  497  384.8 881.8    NG/GT  --  -- --  --  50 50    Total Intake  497 434.8 931.8       Output    Urine  600  735 1335  445  550 995    Emesis/NG output  --  100 100  --  -- --    Total Output  445 550 995        Intake/Output       19 07 - 11/15/19 0700 11/15/19 07 - 19 07      7129-9900 7362-5478 Total 4444-2277 9330-2024 Total       Intake    I.V.  606  502 1108  497  384.8 881.8    NG/GT  --  -- --  --  50 50    Total Intake  497 434.8 931.8       Output    Urine  600  735 1335  445  550 995    Emesis/NG output  --  100 100  --  -- --    Total Output  445 550 995          Intake/Output       19 - 11/15/19 0700 11/15/19 0701 - 19 07        Total  Total       Intake    I.V.  606  502 1108  497  384.8 881.8    NG/GT  --  -- --  --  50 50    Total Intake  497 434.8 931.8       Output    Urine  600  735 1335  445  550 995    Emesis/NG output  --  100 100  --  -- --    Total Output  445 550 995          Review of Systems:   Review of Systems   Unable to perform ROS: Intubated       Objective:     Vital Signs  Vitals:    19 1000 19 1037 19 1045 19 1100   BP: 101/72   101/70   Pulse: 79 80 81 82   Resp:       Temp:       TempSrc:       SpO2: 96% 95% 96% 94%   Weight:       Height:           Physical Exam  Physical Exam   Constitutional: He appears well-developed and well-nourished. No distress.   HENT:   Head: Normocephalic and atraumatic.   Right Ear: External ear normal.   Left Ear: External ear normal.   Nose: Nose normal.   Eyes: Conjunctivae are normal. Right eye exhibits no discharge. Left eye exhibits no discharge.   Neck: Neck supple. No thyromegaly present.   Cardiovascular: Normal rate and regular rhythm.   Pulmonary/Chest: Effort normal. He has no wheezes. He has no rales. He exhibits no tenderness.   ETT in place. OGT in place. Right IJ in place.   Abdominal: Soft. Bowel sounds are normal. He exhibits no distension.   Musculoskeletal: Normal range of motion. He exhibits no edema.   Neurological:   Unable to assess. Intubated/sedated.   Skin: Skin is warm and dry. No rash noted. He is not diaphoretic. No erythema. No pallor.   Psychiatric:   Unable to assess. Intubated/sedated.   Nursing note and vitals reviewed.      Medication Review    Current Facility-Administered Medications:   •  [COMPLETED] amiodarone in dextrose 5% (NEXTERONE) loading dose 150mg/100mL, 150 mg, Intravenous, Once, Stopped at 19 0926 **FOLLOWED BY** [] amiodarone (NEXTERONE) 360 mg/200 mL (1.8 mg/mL) infusion, 1 mg/min, Intravenous, Continuous, Stopped at 19 1526  **FOLLOWED BY** amiodarone (NEXTERONE) 360 mg/200 mL (1.8 mg/mL) infusion, 0.5 mg/min, Intravenous, Continuous, Alisia Burrell APRN, Last Rate: 16.67 mL/hr at 11/16/19 0216, 0.5 mg/min at 11/16/19 0216  •  aspirin EC tablet 81 mg, 81 mg, Oral, Daily, Cheikh Lopez MD  •  atorvastatin (LIPITOR) tablet 10 mg, 10 mg, Oral, Nightly, Elysia Sanchez MD, 10 mg at 11/15/19 2001  •  DOBUTamine (DOBUTREX) 1 mg/mL infusion, 8 mcg/kg/min, Intravenous, Titrated, Elysia Sanchez MD, Last Rate: 10.82 mL/hr at 11/15/19 1959, 2.5 mcg/kg/min at 11/15/19 1959  •  enoxaparin (LOVENOX) syringe 40 mg, 40 mg, Subcutaneous, Q24H, Scarlett Galeas MD, 40 mg at 11/16/19 0954  •  EPINEPHrine (ADRENALIN) 5 mg in sodium chloride 0.9 % 250 mL (0.02 mg/mL) infusion, 0.02-0.5 mcg/kg/min, Intravenous, Titrated, Wander Lemons MD, Stopped at 11/14/19 0306  •  fentaNYL citrate (PF) (SUBLIMAZE) injection 25 mcg, 25 mcg, Intravenous, Q1H PRN, Alisia Burrell APRN, 25 mcg at 11/16/19 0749  •  Influenza Vac Subunit Quad (FLUCELVAX) injection 0.5 mL, 0.5 mL, Intramuscular, During Hospitalization, Emmanuel Maurice,   •  LORazepam (ATIVAN) 100 mg in sodium chloride 0.9 % 100 mL (1 mg/mL) infusion, 1 mg/hr, Intravenous, Titrated, Scarlett Galeas MD, Stopped at 11/16/19 0850  •  magnesium oxide (MAGOX) tablet 400 mg, 400 mg, Oral, BID, Cheikh Lopez MD, 400 mg at 11/16/19 0954  •  midazolam (VERSED) injection 2 mg, 2 mg, Intravenous, Q2H PRN, Alisia Burrell, APRN, 2 mg at 11/15/19 1613  •  nicotine (NICODERM CQ) 21 MG/24HR patch 1 patch, 1 patch, Transdermal, Q24H, Wander Lemons MD, 1 patch at 11/16/19 1006  •  norepinephrine (LEVOPHED) 8 mg/250 mL (32 mcg/mL) in sodium chloride 0.9% infusion (premix), 0.02-0.3 mcg/kg/min, Intravenous, Titrated, Wander Lemons MD, Stopped at 11/14/19 0100  •  ondansetron (ZOFRAN) injection 4 mg, 4 mg, Intravenous, Q6H PRN, Emmanuel Maurice DO, 4 mg at 11/13/19 1200  •   pantoprazole (PROTONIX) injection 40 mg, 40 mg, Intravenous, Q AM, David Moreland MD, 40 mg at 11/16/19 0534  •  sodium chloride 0.9 % flush 10 mL, 10 mL, Intravenous, Q12H, Scarlett Galeas MD, 10 mL at 11/16/19 1007  •  sodium chloride 0.9 % flush 10 mL, 10 mL, Intravenous, Q12H, Scarlett Galeas MD, 10 mL at 11/16/19 0957  •  sodium chloride 0.9 % flush 10 mL, 10 mL, Intravenous, Q12H, Scarlett Galeas MD, 10 mL at 11/16/19 0956  •  sodium chloride 0.9 % flush 10 mL, 10 mL, Intravenous, PRN, Scarlett Galeas MD  •  sodium chloride 0.9 % flush 20 mL, 20 mL, Intravenous, PRN, Scarlett Galeas MD  •  Vasopressin (PITRESSIN) 20 Units in sodium chloride 0.9 % 100 mL (0.2 Units/mL) infusion, 0.02-0.1 Units/min, Intravenous, Continuous PRN, Scarlett Galeas MD, Stopped at 11/14/19 0331     Diagnostic Data    Lab Results (last 24 hours)     Procedure Component Value Units Date/Time    Blood Gas, Arterial [[id number]]  (Abnormal) Collected:  11/16/19 0830    Specimen:  Arterial Blood Updated:  11/16/19 0848     Site Arterial Line     Juan's Test N/A     pH, Arterial 7.482 pH units      Comment: 83 Value above reference range        pCO2, Arterial 47.8 mm Hg      Comment: 83 Value above reference range        pO2, Arterial 72.5 mm Hg      Comment: 84 Value below reference range        HCO3, Arterial 35.7 mmol/L      Comment: 83 Value above reference range        Base Excess, Arterial 10.6 mmol/L      Comment: 83 Value above reference range        O2 Saturation, Arterial 94.9 %      Barometric Pressure for Blood Gas 756 mmHg      Modality N/A     FIO2 30 %      Ventilator Mode NA     PEEP 5.0     PSV 8.0 cmH2O      Collected by      Comment: Meter: [id number]     :  [id number]       CBC & Differential [[id number]] Collected:  11/16/19 0545    Specimen:  Blood Updated:  11/16/19 0613    Narrative:       The following orders were created for panel order CBC & Differential.  Procedure                                Abnormality         Status                     ---------                               -----------         ------                     CBC Auto Differential[[id number]]        Abnormal            Final result                 Please view results for these tests on the individual orders.    CBC Auto Differential [[id number]]  (Abnormal) Collected:  11/16/19 0545    Specimen:  Blood Updated:  11/16/19 0613     WBC 11.22 10*3/mm3      RBC 3.88 10*6/mm3      Hemoglobin 12.1 g/dL      Hematocrit 36.9 %      MCV 95.1 fL      MCH 31.2 pg      MCHC 32.8 g/dL      RDW 16.4 %      RDW-SD 57.0 fl      MPV 11.3 fL      Platelets 94 10*3/mm3      Neutrophil % 83.3 %      Lymphocyte % 7.1 %      Monocyte % 8.8 %      Eosinophil % 0.0 %      Basophil % 0.3 %      Immature Grans % 0.5 %      Neutrophils, Absolute 9.34 10*3/mm3      Lymphocytes, Absolute 0.80 10*3/mm3      Monocytes, Absolute 0.99 10*3/mm3      Eosinophils, Absolute 0.00 10*3/mm3      Basophils, Absolute 0.03 10*3/mm3      Immature Grans, Absolute 0.06 10*3/mm3      nRBC 0.0 /100 WBC     Basic Metabolic Panel [[id number]]  (Abnormal) Collected:  11/16/19 0545    Specimen:  Blood Updated:  11/16/19 0612     Glucose 122 mg/dL      BUN 34 mg/dL      Creatinine 1.25 mg/dL      Sodium 143 mmol/L      Potassium 3.4 mmol/L      Chloride 100 mmol/L      CO2 35.0 mmol/L      Calcium 8.4 mg/dL      eGFR Non African Amer 60 mL/min/1.73      BUN/Creatinine Ratio 27.2     Anion Gap 8.0 mmol/L     Narrative:       GFR Normal >60  Chronic Kidney Disease <60  Kidney Failure <15    aPTT [[id number]]  (Normal) Collected:  11/15/19 1540    Specimen:  Blood Updated:  11/15/19 1603     PTT 34.6 seconds     Narrative:       The recommended Heparin therapeutic range is 68-97 seconds.    aPTT [[id number]]  (Abnormal) Collected:  11/15/19 1329    Specimen:  Blood Updated:  11/15/19 1355     PTT 63.2 seconds     Narrative:       The recommended Heparin therapeutic range is  68-97 seconds.    Respiratory Culture - Sputum, ET Suction [[id number]] Collected:  11/14/19 1315    Specimen:  Sputum from ET Suction Updated:  11/15/19 1258     Respiratory Culture Scant growth (1+) Normal Respiratory Minor     Gram Stain Many (4+) WBCs seen      No epithelial cells seen      Mixed bacterial minor          I reviewed the patient's new clinical results.    Assessment/Plan:     Active Hospital Problems    Diagnosis POA   • **Chest pain [R07.9] Unknown     Added automatically from request for surgery [id number]     • Cardiogenic shock (CMS/HCC) [R57.0] Unknown   • Ventricular tachycardia (paroxysmal) (CMS/HCC) [I47.2] Unknown   • Elevated troponin [R79.89] Yes       #. Cardiogenic shock.   11/16. Balloon pump out yesterday.   11/15. Cards following. AICD. Balloon pump. Dobutamine. Lasix.  #. Vtach. Paroxysmal.   11/15 - 11/16. Amio gtt.  11/14. AICD discharge overnight. Amio gtt.  #. Post op respiratory failure. Intubated/sedated.   11/15 - 11/16. Pulm consulted for vent management.  11/14. Will consult pulmonology for vent management.  #. NAYELY on CKD III.   11/16. Improving. Monitor.  11/15. Stable.   11/14. Improving towards baseline. Monitor. Consult nephrology as needed.  Results from last 7 days   Lab Units 11/16/19  0545 11/15/19  0546 11/14/19  1624 11/14/19  0557 11/13/19  1518 11/13/19  0710 11/12/19  0559 11/11/19  0530 11/10/19  1219   CREATININE mg/dL 1.25 1.61* 1.53* 1.68* 1.79* 1.36* 1.15 1.06 1.10   #. NICM w/ severe LV dysfunction. EF less then 20%.   Results for orders placed during the hospital encounter of 11/10/19   Adult Transthoracic Echo Limited W/ Cont if Necessary Per Protocol    Narrative · The left ventricular cavity is moderately dilated.  · Right ventricular cavity is moderately dilated.  · Mildly reduced right ventricular systolic function noted.  · Moderate-to-severe mitral valve regurgitation is present  · Moderate tricuspid valve regurgitation is present.  · There is no  evidence of pericardial effusion.      #. Polysubstance abuse. Cessation counseling when able.  #. ICM.   #. Nutrition.   11/16. SLP once extubated.  11/15. TF's today.      Will monitor patient's hospital course and adjust treatment as hospital course dictates.    DVT prophylaxis: Heparin gtt  Code status is   Code Status and Medical Interventions:   Ordered at: 11/10/19 1206     Level Of Support Discussed With:    Patient     Code Status:    CPR     Medical Interventions (Level of Support Prior to Arrest):    Full       Plan for disposition:Planning ongoing.      Time:           This document has been electronically signed by David Moreland MD on November 16, 2019 11:15 AM

## 2019-11-16 NOTE — SIGNIFICANT NOTE
"Patient was extubated after passing SBT this morning. Decision by family at that point was no CPR or re intubation if he failed extubation. Patient initially did ok post extubation, but he became combative and very agitated. Patient appeared alert and was able to follow a few one step commands immediately post extubation, but as the day wore on patient lost this ability. Attempts at sedation post extubation commenced, but patient's blood pressure and respiratory status worsened and he remained combative and agitated. Patient's son, daughter, girlfriend, and mother all decided that he would not \"want to live like this\". We also had talk about dying with dignity, quality of life going forward, and goals of care. The consensus from this was that patient would not want extraordinary measures and life prolonging measures. We talked about further sedation, but also the risks of this in light of not intubating him again.  Decision by all involved at this point was to transition patient to comfort measures. Morphine, ativan, scopolamine, and haldol ordered. Patient transitioned to comfort measures with family at bedside.            This document has been electronically signed by David Moreland MD on November 16, 2019 4:47 PM    "

## 2019-11-16 NOTE — PROGRESS NOTES
[facility] Cardiology  INPATIENT PROGRESS NOTE    Name: Matthew Danielle  Age/Sex: 54 y.o. male  :  1965        PCP: Provider, No Known    Vital Signs  Temp:  [98.4 °F (36.9 °C)-100 °F (37.8 °C)] 98.5 °F (36.9 °C)  Heart Rate:  [65-86] 66  Resp:  [16-23] 16  BP: ()/(61-80) 92/66  Arterial Line BP: (87-99)/(56-63) 91/63  FiO2 (%):  [30 %] 30 %  Body mass index is 22.89 kg/m² (pended).     Subjective sedated.  Tolerated IABP out.    Patient Active Problem List   Diagnosis   • Elevated troponin   • Chest pain   • Cardiogenic shock (CMS/HCC)   • Ventricular tachycardia (paroxysmal) (CMS/HCC)       History reviewed. No pertinent past medical history.    Current Facility-Administered Medications   Medication Dose Route Frequency Provider Last Rate Last Dose   • amiodarone (NEXTERONE) 360 mg/200 mL (1.8 mg/mL) infusion  0.5 mg/min Intravenous Continuous Alisia Burrell APRN 16.67 mL/hr at 19 0.5 mg/min at 19   • aspirin EC tablet 81 mg  81 mg Oral Daily Cheikh Lopez MD       • atorvastatin (LIPITOR) tablet 10 mg  10 mg Oral Nightly Elysia Sanchez MD   10 mg at 11/15/19 2001   • DOBUTamine (DOBUTREX) 1 mg/mL infusion  8 mcg/kg/min Intravenous Titrated Elysia Sanchez MD 10.82 mL/hr at 11/15/19 1959 2.5 mcg/kg/min at 11/15/19 1959   • enoxaparin (LOVENOX) syringe 40 mg  40 mg Subcutaneous Q24H Scarlett Galeas MD       • EPINEPHrine (ADRENALIN) 5 mg in sodium chloride 0.9 % 250 mL (0.02 mg/mL) infusion  0.02-0.5 mcg/kg/min Intravenous Titrated Wander Lemons MD   Stopped at 19 0306   • fentaNYL citrate (PF) (SUBLIMAZE) injection 25 mcg  25 mcg Intravenous Q1H PRN Alisia Burrell APRN   25 mcg at 19 0749   • Influenza Vac Subunit Quad (FLUCELVAX) injection 0.5 mL  0.5 mL Intramuscular During Hospitalization Emmanuel Maurice,        • LORazepam (ATIVAN) 100 mg in sodium chloride 0.9 % 100 mL (1 mg/mL) infusion  1 mg/hr Intravenous  Titrated Scarlett Galeas MD 8.5 mL/hr at 11/16/19 0407 8.5 mg/hr at 11/16/19 0407   • magnesium oxide (MAGOX) tablet 400 mg  400 mg Oral BID Cheikh Lopez MD   400 mg at 11/15/19 2001   • midazolam (VERSED) injection 2 mg  2 mg Intravenous Q2H PRN Alisia Burrell APRN   2 mg at 11/15/19 1613   • nicotine (NICODERM CQ) 21 MG/24HR patch 1 patch  1 patch Transdermal Q24H Wander Lemons MD   1 patch at 11/15/19 0913   • norepinephrine (LEVOPHED) 8 mg/250 mL (32 mcg/mL) in sodium chloride 0.9% infusion (premix)  0.02-0.3 mcg/kg/min Intravenous Titrated Wander Lemons MD   Stopped at 11/14/19 0100   • ondansetron (ZOFRAN) injection 4 mg  4 mg Intravenous Q6H PRN Emmanuel Maurice DO   4 mg at 11/13/19 1200   • pantoprazole (PROTONIX) injection 40 mg  40 mg Intravenous Once Wander Lemons MD       • pantoprazole (PROTONIX) injection 40 mg  40 mg Intravenous Q AM David Moreland MD   40 mg at 11/16/19 0534   • sodium chloride 0.9 % flush 10 mL  10 mL Intravenous Q12H Scarlett Galeas MD   10 mL at 11/15/19 2003   • sodium chloride 0.9 % flush 10 mL  10 mL Intravenous Q12H Scarlett Galeas MD   10 mL at 11/15/19 2003   • sodium chloride 0.9 % flush 10 mL  10 mL Intravenous Q12H Scarlett Galeas MD   10 mL at 11/15/19 2002   • sodium chloride 0.9 % flush 10 mL  10 mL Intravenous PRN Scarlett Galeas MD       • sodium chloride 0.9 % flush 20 mL  20 mL Intravenous PRN Scarlett Galeas MD       • Vasopressin (PITRESSIN) 20 Units in sodium chloride 0.9 % 100 mL (0.2 Units/mL) infusion  0.02-0.1 Units/min Intravenous Continuous PRN Scarlett Galeas MD   Stopped at 11/14/19 0331       Past Surgical History:   Procedure Laterality Date   • CARDIAC CATHETERIZATION N/A 11/12/2019    Procedure: Left Heart Cath;  Surgeon: Cheikh Lopez MD;  Location: [facility] CATH INVASIVE LOCATION;  Service: Cardiology   • CARDIAC CATHETERIZATION Right 11/13/2019    Procedure: Intra-Aortic Baloon Pump  Insertion;  Surgeon: Elysia Sanchez MD;  Location: [facility] CATH INVASIVE LOCATION;  Service: Cardiology   • CARDIAC ELECTROPHYSIOLOGY PROCEDURE N/A 11/13/2019    Procedure: Biventricular Device Insertion;  Surgeon: Elysia Sanchez MD;  Location: [facility] CATH INVASIVE LOCATION;  Service: Cardiology   • HERNIA REPAIR Bilateral     inguinal   • INCISION AND DRAINAGE ABSCESS Right 08/31/2019    right thumb joint   • JOINT REPLACEMENT Left 1988    knee       Social History     Socioeconomic History   • Marital status: Single     Spouse name: Not on file   • Number of children: Not on file   • Years of education: Not on file   • Highest education level: Not on file   Tobacco Use   • Smoking status: Current Every Day Smoker     Packs/day: 1.00     Years: 39.00     Pack years: 39.00     Types: Cigarettes   • Smokeless tobacco: Never Used   Substance and Sexual Activity   • Alcohol use: No     Frequency: Never   • Drug use: Yes     Frequency: 3.0 times per week     Types: Marijuana   • Sexual activity: Yes     Partners: Female     Birth control/protection: None       O/E    Neck: Supple.  No JVD, no thyroid enlargement.  Chest: Air entry equal, normal respiration.  No rhonchi or creps.  Cardiovascular system:  Regular rate and rhythm, no murmurs.  Abdomen: Soft, no tenderness, bowel sounds present, no hepatosplenomegaly.  CNS: Alert, oriented to place and time.  No motor or sensory deficit.  Cranial nerves intact.  Musculoskeletal: No deformity of the back or spine.  Extremities:  No edema.  Pulses equal on both sides.    Lab Results (last 24 hours)     Procedure Component Value Units Date/Time    aPTT [[id number]]  (Abnormal) Collected:  11/15/19 0809    Specimen:  Blood Updated:  11/15/19 0833     PTT 53.2 seconds     Narrative:       The recommended Heparin therapeutic range is 68-97 seconds.    aPTT [[id number]]  (Abnormal) Collected:  11/15/19 1329    Specimen:  Blood Updated:  11/15/19 1355     PTT 63.2 seconds      Narrative:       The recommended Heparin therapeutic range is 68-97 seconds.    aPTT [[id number]]  (Normal) Collected:  11/15/19 1540    Specimen:  Blood Updated:  11/15/19 1603     PTT 34.6 seconds     Narrative:       The recommended Heparin therapeutic range is 68-97 seconds.    CBC & Differential [[id number]] Collected:  11/16/19 0545    Specimen:  Blood Updated:  11/16/19 0613    Narrative:       The following orders were created for panel order CBC & Differential.  Procedure                               Abnormality         Status                     ---------                               -----------         ------                     CBC Auto Differential[[id number]]        Abnormal            Final result                 Please view results for these tests on the individual orders.    Basic Metabolic Panel [[id number]]  (Abnormal) Collected:  11/16/19 0545    Specimen:  Blood Updated:  11/16/19 0612     Glucose 122 mg/dL      BUN 34 mg/dL      Creatinine 1.25 mg/dL      Sodium 143 mmol/L      Potassium 3.4 mmol/L      Chloride 100 mmol/L      CO2 35.0 mmol/L      Calcium 8.4 mg/dL      eGFR Non African Amer 60 mL/min/1.73      BUN/Creatinine Ratio 27.2     Anion Gap 8.0 mmol/L     Narrative:       GFR Normal >60  Chronic Kidney Disease <60  Kidney Failure <15    CBC Auto Differential [[id number]]  (Abnormal) Collected:  11/16/19 0545    Specimen:  Blood Updated:  11/16/19 0613     WBC 11.22 10*3/mm3      RBC 3.88 10*6/mm3      Hemoglobin 12.1 g/dL      Hematocrit 36.9 %      MCV 95.1 fL      MCH 31.2 pg      MCHC 32.8 g/dL      RDW 16.4 %      RDW-SD 57.0 fl      MPV 11.3 fL      Platelets 94 10*3/mm3      Neutrophil % 83.3 %      Lymphocyte % 7.1 %      Monocyte % 8.8 %      Eosinophil % 0.0 %      Basophil % 0.3 %      Immature Grans % 0.5 %      Neutrophils, Absolute 9.34 10*3/mm3      Lymphocytes, Absolute 0.80 10*3/mm3      Monocytes, Absolute 0.99 10*3/mm3      Eosinophils, Absolute 0.00 10*3/mm3       Basophils, Absolute 0.03 10*3/mm3      Immature Grans, Absolute 0.06 10*3/mm3      nRBC 0.0 /100 WBC           A/P    CM w CGSHOCK post ICD    IABP out.  Dc ativan.    Hopefully extubated today as we did it only to sedtae pt while on IABP.        This document has been electronically signed by Scarlett Galeas MD on November 16, 2019 7:52 AM         Part of this note may be an electronic transcription/translation of spoken language to printed text using the Dragon Dictation System.

## 2019-11-16 NOTE — PLAN OF CARE
Problem: Patient Care Overview  Goal: Plan of Care Review  Outcome: Ongoing (interventions implemented as appropriate)   11/15/19 4294   Coping/Psychosocial   Plan of Care Reviewed With daughter;son;significant other   Plan of Care Review   Progress improving   OTHER   Outcome Summary Pt taken off balloon pump. VSS since. Cortrack placed to right nare. Awaiting confiirmation of placement prior to feeds. A-line D/c'd. Heparin Dc'd. No blood thinners per Dr. Galeas. Pulmonary consulted. Will attempt SBT in the am. Will continue to monitor.        Problem: Pain, Chronic (Adult)  Goal: Identify Related Risk Factors and Signs and Symptoms  Outcome: Ongoing (interventions implemented as appropriate)    Goal: Acceptable Pain/Comfort Level and Functional Ability  Outcome: Ongoing (interventions implemented as appropriate)      Problem: Cardiac: Heart Failure (Adult)  Goal: Signs and Symptoms of Listed Potential Problems Will be Absent, Minimized or Managed (Cardiac: Heart Failure)  Outcome: Ongoing (interventions implemented as appropriate)      Problem: Ventilation, Mechanical Invasive (Adult)  Goal: Signs and Symptoms of Listed Potential Problems Will be Absent, Minimized or Managed (Ventilation, Mechanical Invasive)  Outcome: Ongoing (interventions implemented as appropriate)      Problem: Skin Injury Risk (Adult)  Goal: Identify Related Risk Factors and Signs and Symptoms  Outcome: Ongoing (interventions implemented as appropriate)    Goal: Skin Health and Integrity  Outcome: Ongoing (interventions implemented as appropriate)      Problem: Fall Risk (Adult)  Goal: Identify Related Risk Factors and Signs and Symptoms  Outcome: Ongoing (interventions implemented as appropriate)      Problem: Intra-Aortic Balloon Pump (Adult)  Goal: Signs and Symptoms of Listed Potential Problems Will be Absent, Minimized or Managed (Intra-Aortic Balloon Pump)  Outcome: Outcome(s) achieved Date Met: 11/15/19      Problem: Restraint,  Nonbehavioral (Nonviolent)  Goal: Rationale and Justification  Outcome: Ongoing (interventions implemented as appropriate)    Goal: Nonbehavioral (Nonviolent) Restraint: Absence of Injury/Harm  Outcome: Ongoing (interventions implemented as appropriate)    Goal: Nonbehavioral (Nonviolent) Restraint: Achievement of Discontinuation Criteria  Outcome: Ongoing (interventions implemented as appropriate)    Goal: Nonbehavioral (Nonviolent) Restraint: Preservation of Dignity and Wellbeing  Outcome: Ongoing (interventions implemented as appropriate)

## 2019-11-16 NOTE — PLAN OF CARE
Problem: Patient Care Overview  Goal: Plan of Care Review   11/16/19 0659   Coping/Psychosocial   Plan of Care Reviewed With family   Plan of Care Review   Progress improving   OTHER   Outcome Summary BP low normal, still having occasional runs of VT, pain controlled with fentanyl pushes, remains ETT to vent, sedated on ativan drip       Problem: Cardiac: ACS (Acute Coronary Syndrome) (Adult)  Goal: Signs and Symptoms of Listed Potential Problems Will be Absent, Minimized or Managed (Cardiac: ACS)   11/16/19 0659   Goal/Outcome Evaluation   Problems Assessed (Acute Coronary Syndrome) all   Problems Present (Acute Coronary Syn) dysrhythmia/arrhythmia;heart failure/shock;situational response       Problem: Cardiac: Heart Failure (Adult)  Goal: Signs and Symptoms of Listed Potential Problems Will be Absent, Minimized or Managed (Cardiac: Heart Failure)   11/16/19 0659   Goal/Outcome Evaluation   Problems Assessed (Heart Failure) all   Problems Present (Heart Failure) cardiac pump dysfunction;respiratory compromise;situational response       Problem: Ventilation, Mechanical Invasive (Adult)  Goal: Signs and Symptoms of Listed Potential Problems Will be Absent, Minimized or Managed (Ventilation, Mechanical Invasive)   11/16/19 0659   Goal/Outcome Evaluation   Problems Assessed (Mechanical Ventilation, Invasive) all   Problems Present (Mech Vent, Invasive) inability to wean;situational response       Problem: Skin Injury Risk (Adult)  Goal: Identify Related Risk Factors and Signs and Symptoms   11/16/19 0659   Skin Injury Risk (Adult)   Related Risk Factors (Skin Injury Risk) critical care admission;hospitalization prolonged;mechanical forces;medical devices;medication;mobility impaired     Goal: Skin Health and Integrity   11/16/19 0659   Skin Injury Risk (Adult)   Skin Health and Integrity making progress toward outcome       Problem: Fall Risk (Adult)  Goal: Absence of Fall   11/16/19 0659   Fall Risk (Adult)   Absence  of Fall achieves outcome       Problem: Restraint, Nonbehavioral (Nonviolent)  Goal: Rationale and Justification   11/16/19 0659   Restraint, Nonbehavioral (Nonviolent)   Rationale and Justification prevent harm to self;prevent line/tube removal;failure of less restrictive safety measures     Goal: Nonbehavioral (Nonviolent) Restraint: Absence of Injury/Harm   11/16/19 0659   Restraint, Nonbehavioral (Nonviolent)   Nonbehavioral (Nonviolent) Restraint: Absence of Injury/Harm met     Goal: Nonbehavioral (Nonviolent) Restraint: Achievement of Discontinuation Criteria   11/16/19 0659   Restraint, Nonbehavioral (Nonviolent)   Nonbehavioral (Nonviolent) Restraint: Achievement of Discontinuation Criteria not met     Goal: Nonbehavioral (Nonviolent) Restraint: Preservation of Dignity and Wellbeing   11/16/19 0659   Restraint, Nonbehavioral (Nonviolent)   Nonbehavioral (Nonviolent) Restraint: Preservation of Dignity and Wellbeing met       Problem: Renal Failure/Kidney Injury, Acute (Adult)  Goal: Signs and Symptoms of Listed Potential Problems Will be Absent, Minimized or Managed (Renal Failure/Kidney Injury, Acute)   11/16/19 0659   Goal/Outcome Evaluation   Problems Assessed (Acute Renal Failure/Kidney Injury) all   Problems Present (ARF/Kidney Injury) electrolyte imbalance       Problem: Nutrition, Enteral (Adult)  Goal: Signs and Symptoms of Listed Potential Problems Will be Absent, Minimized or Managed (Nutrition, Enteral)   11/16/19 0659   Goal/Outcome Evaluation   Problems Assessed (Enteral Nutrition) all   Problems Present (Enteral Nutrition) none

## 2019-11-16 NOTE — PLAN OF CARE
Problem: Nutrition, Enteral (Adult)  Goal: Signs and Symptoms of Listed Potential Problems Will be Absent, Minimized or Managed (Nutrition, Enteral)  Outcome: Ongoing (interventions implemented as appropriate)

## 2019-11-16 NOTE — PROGRESS NOTES
Ventilator follow-up:  Spontaneous breathing trial this morning revealed a PO2 of 73, PCO2 48, pH 7.48 on pressure support of 8, PEEP 5, respiratory rate 12, vital signs okay he is alert and follows commands.    Impression:  Cardiogenic shock  Paroxysmal ventricular tachycardia  History of COPD    Recommendations:  Extubate  Nasal O2 4 L/min  Nebulizer with albuterol and Atrovent 4 times a day

## 2019-11-17 PROBLEM — I50.31 ACUTE HEART FAILURE WITH PRESERVED EJECTION FRACTION (HCC): Status: ACTIVE | Noted: 2019-01-01

## 2019-11-17 PROBLEM — I50.21 ACUTE SYSTOLIC HEART FAILURE (HCC): Status: ACTIVE | Noted: 2019-01-01

## 2019-11-17 NOTE — DISCHARGE SUMMARY
DEATH SUMMARY    NAME: Matthew Danielle   PHYSICIAN: David Moreland MD  : 1965  MRN: [id number]    ADMITTED: 11/10/2019   DISCHARGED:19         TIME OF DEATH: 040   CONTACTED: No    AUTOPSY REQUESTED: No    ADMISSION DIAGNOSES:  Present on Admission:  • Elevated troponin  • Chest pain  • Acute systolic heart failure (CMS/HCC)    DISCHARGE DIAGNOSES:     Cardiogenic shock (CMS/HCC)    Acute systolic heart failure (CMS/HCC)    Ventricular tachycardia (paroxysmal) (CMS/HCC)    Elevated troponin    Chest pain      SERVICE: Medicine. Attending David Moreland MD    HISTORY OF PRESENT ILLNESS: *Copied from Emmanuel Maurice DO's H&P*  54-year-old  male with no medical history presented as a transfer from [place] for elevated troponin, new onset heart failure, worsening shortness of breath.  Patient has been having some chest cold for past 5 to 6 days.  Cold medications did not help.  Worsening shortness of breath with exertion, lying flat over the past few days.  Worse today early in the morning around 3:30 and he went to ER and was transferred here for further evaluation.  Relevant labs/images from the transferring facility include troponin of 0.1, BNP of 16,042 creatinine of 1.3 that is normal, chest x-ray with possible heart failure.    HOSPITAL COURSE:  Active Hospital Problems    Diagnosis POA   • **Cardiogenic shock (CMS/HCC) [R57.0] No   • Acute heart failure with preserved ejection fraction (CMS/HCC) [I50.31] Yes   • Ventricular tachycardia (paroxysmal) (CMS/HCC) [I47.2] No   • Elevated troponin [R79.89] Yes   • Chest pain [R07.9] Yes     Patient was initially admitted as a transfer for elevated trop and new onset heart failure. Patient with severely reduced EF. Patient noted to have non sustained Vtach at our facility. He was scheduled for biventricular AICD in light of his HFrEF. Patient had balloon pump and AICD placed. Patient was intubated post procedure. He was on multiple  "vasoactive agents and dobutamine post procedure. He also developed Vtach and was defibrillated by his AICD. He was started on amio gtt. He was ultimately weaned off pressors, vent, and balloon pump.     Patient was extubated after passing SBT on 2019. Decision by family at that point was no CPR or re intubation if he failed extubation. Patient initially did ok post extubation, but he became combative and very agitated. Patient appeared alert and was able to follow a few one step commands immediately post extubation, but as the day wore on patient lost this ability. Attempts at sedation post extubation commenced, but patient's blood pressure and respiratory status worsened and he remained combative and agitated. Patient's son, daughter, girlfriend, and mother all decided that he would not \"want to live like this\". We also talked about dying with dignity, quality of life going forward, and goals of care. The consensus from this talk was that patient would not want extraordinary measures and life prolonging measures. We talked about further sedation, but also the risks of this in light of not intubating him again.  Decision by all involved at this point was to transition patient to comfort measures. Morphine, ativan, scopolamine, and haldol ordered. Patient transitioned to comfort measures with family at bedside.    Patient passed peacefully with family at bedside at 0402 on 2019.    DISCHARGE CONDITION:       Time: Discharge 20 min          This document has been electronically signed by David Moreland MD on 2019 9:20 AM   "

## 2019-11-20 NOTE — ANESTHESIA PROCEDURE NOTES
Arterial Line      Patient reassessed immediately prior to procedure    Patient location during procedure: OR  Start time: 11/13/2019 12:37 PM  Stop Time:11/13/2019 12:41 PM       Line placed for hemodynamic monitoring.  Performed By   CRNA: Adrianna Winter CRNA  Preanesthetic Checklist  Completed: patient identified and risks and benefits discussed  Arterial Line Prep   Sterile Tech: gloves  Prep: ChloraPrep  Patient monitoring: blood pressure monitoring, continuous pulse oximetry and EKG  Arterial Line Procedure   Laterality:right  Location:  radial artery  Catheter size: 20 G   Guidance: landmark technique and palpation technique  Successful placement: yes  Post Assessment   Dressing Type: occlusive dressing applied and secured with tape.   Complications no  Patient Tolerance: patient tolerated the procedure well with no apparent complications

## 2019-11-20 NOTE — ADDENDUM NOTE
Addendum  created 11/20/19 0644 by Alireza Seo MD    Child order released for a procedure order, Intraprocedure Blocks edited, Sign clinical note

## 2024-12-02 NOTE — PROGRESS NOTES
No protocol for requested medication.    Medication:    Disp Refills Start End    LORazepam (ATIVAN) 0.5 MG tablet 20 tablet 0 10/31/2024 --    Sig - Route: Take 1 tablet by mouth 2 times daily as needed for Anxiety. - Oral      Last office visit date: 11/4/24  Pharmacy: [id number] - [place], [place] - [address]    Order pended, routed to clinician for review.    FORWARD TO PROVIDER - Refill requests for these medications must ALWAYS be forwarded to the ordering provider, even if all criteria are met    PDMP has been reviewed in last 24 hours    Urine/serum drug screen on file in the appropriate time frame    Active/up-to-date controlled substances agreement/consent on file      Patient had episodes of nonsustained ventricular tachycardia.  Patient was evaluated by Dr. Sanchez.  Patient is to undergo a biventricular AICD.  Patient right groin did not reveal of any evidence of any hematoma.

## (undated) DEVICE — KT INTRO MINISTICK MAX W/GW NITNL/TUNG ECHO 4F 21G 7CM

## (undated) DEVICE — GW PTFE EMERALD HC J TIP STD .025 3MM 150CM

## (undated) DEVICE — INTRO SHEATH ART/FEM ENGAGE .038 6F12CM

## (undated) DEVICE — GW PERIPH GUIDERIGHT STD/J/TP PTFE/PCOAT SS 0.038IN 5X150CM

## (undated) DEVICE — Device

## (undated) DEVICE — CATH DIAG EXPO M/ PK 6FR FL4/FR4 PIG 3PK

## (undated) DEVICE — SKIN AFFIX SURG ADHESIVE 72/CS 0.55ML: Brand: MEDLINE

## (undated) DEVICE — MODEL BT2000 P/N 700287-012KIT CONTENTS: MANIFOLD WITH SALINE AND CONTRAST PORTS, SALINE TUBING WITH SPIKE AND HAND SYRINGE, TRANSDUCER: Brand: BT2000 AUTOMATED MANIFOLD KIT

## (undated) DEVICE — ELECTRODE,RT,STRESS,FOAM,50PK: Brand: MEDLINE

## (undated) DEVICE — INTRO SHEATH ENGAGE W/50 GW .038 7F12

## (undated) DEVICE — PK CATH LAB 60

## (undated) DEVICE — MODEL AT P65, P/N 701554-001KIT CONTENTS: HAND CONTROLLER, 3-WAY HIGH-PRESSURE STOPCOCK WITH ROTATING END AND PREMIUM HIGH-PRESSURE TUBING: Brand: ANGIOTOUCH® KIT

## (undated) DEVICE — GW PERIPH GUIDERIGHT STD/EXCHNG/J/TIP SS 0.035IN 5X260CM

## (undated) DEVICE — A2000 MULTI-USE SYRINGE KIT, P/N 701277-003KIT CONTENTS: 100ML CONTRAST RESERVOIR AND TUBING WITH CONTRAST SPIKE AND CLAMP: Brand: A2000 MULTI-USE SYRINGE KIT

## (undated) DEVICE — PK PM 60

## (undated) DEVICE — ANGIO-SEAL EVOLUTION VASCULAR CLOSURE DEVICE: Brand: ANGIO-SEAL

## (undated) DEVICE — PERCLOSE PROGLIDE™ SUTURE-MEDIATED CLOSURE SYSTEM: Brand: PERCLOSE PROGLIDE™

## (undated) DEVICE — HI-TORQUE WHISPER MS GUIDE WIRE .014 J TIP 3.0 CM X 190 CM: Brand: HI-TORQUE WHISPER

## (undated) DEVICE — INTRO SHEATH ENGAGE W/50 GW .038 8F12